# Patient Record
Sex: FEMALE | Race: WHITE | Employment: FULL TIME | URBAN - METROPOLITAN AREA
[De-identification: names, ages, dates, MRNs, and addresses within clinical notes are randomized per-mention and may not be internally consistent; named-entity substitution may affect disease eponyms.]

---

## 2017-04-26 ENCOUNTER — HOSPITAL ENCOUNTER (EMERGENCY)
Age: 52
Discharge: HOME | End: 2017-04-26
Attending: EMERGENCY MEDICINE
Payer: COMMERCIAL

## 2017-04-26 ENCOUNTER — APPOINTMENT (OUTPATIENT)
Dept: CT IMAGING | Age: 52
End: 2017-04-26
Attending: PHYSICIAN ASSISTANT
Payer: COMMERCIAL

## 2017-04-26 VITALS
OXYGEN SATURATION: 100 % | TEMPERATURE: 98 F | RESPIRATION RATE: 16 BRPM | DIASTOLIC BLOOD PRESSURE: 69 MMHG | HEART RATE: 94 BPM | SYSTOLIC BLOOD PRESSURE: 99 MMHG | BODY MASS INDEX: 28.25 KG/M2 | HEIGHT: 67 IN | WEIGHT: 180 LBS

## 2017-04-26 DIAGNOSIS — K57.92 ACUTE DIVERTICULITIS: Primary | ICD-10-CM

## 2017-04-26 PROCEDURE — 87086 URINE CULTURE/COLONY COUNT: CPT

## 2017-04-26 PROCEDURE — 96361 HYDRATE IV INFUSION ADD-ON: CPT

## 2017-04-26 PROCEDURE — 96366 THER/PROPH/DIAG IV INF ADDON: CPT

## 2017-04-26 PROCEDURE — 85025 COMPLETE CBC W/AUTO DIFF WBC: CPT | Performed by: PHYSICIAN ASSISTANT

## 2017-04-26 PROCEDURE — 83690 ASSAY OF LIPASE: CPT | Performed by: PHYSICIAN ASSISTANT

## 2017-04-26 PROCEDURE — 96365 THER/PROPH/DIAG IV INF INIT: CPT

## 2017-04-26 PROCEDURE — 80053 COMPREHEN METABOLIC PANEL: CPT | Performed by: PHYSICIAN ASSISTANT

## 2017-04-26 PROCEDURE — 74177 CT ABD & PELVIS W/CONTRAST: CPT

## 2017-04-26 PROCEDURE — 81001 URINALYSIS AUTO W/SCOPE: CPT

## 2017-04-26 PROCEDURE — 99284 EMERGENCY DEPT VISIT MOD MDM: CPT

## 2017-04-26 PROCEDURE — 81025 URINE PREGNANCY TEST: CPT

## 2017-04-26 PROCEDURE — 87147 CULTURE TYPE IMMUNOLOGIC: CPT | Performed by: EMERGENCY MEDICINE

## 2017-04-26 PROCEDURE — 96367 TX/PROPH/DG ADDL SEQ IV INF: CPT

## 2017-04-26 PROCEDURE — 96375 TX/PRO/DX INJ NEW DRUG ADDON: CPT

## 2017-04-26 PROCEDURE — 99285 EMERGENCY DEPT VISIT HI MDM: CPT

## 2017-04-26 RX ORDER — ACETAMINOPHEN 500 MG
1000 TABLET ORAL ONCE
Status: COMPLETED | OUTPATIENT
Start: 2017-04-26 | End: 2017-04-26

## 2017-04-26 RX ORDER — DOCUSATE SODIUM 100 MG/1
100 CAPSULE, LIQUID FILLED ORAL 2 TIMES DAILY
Qty: 60 CAPSULE | Refills: 0 | Status: SHIPPED | OUTPATIENT
Start: 2017-04-26 | End: 2017-05-26

## 2017-04-26 RX ORDER — KETOROLAC TROMETHAMINE 30 MG/ML
30 INJECTION, SOLUTION INTRAMUSCULAR; INTRAVENOUS ONCE
Status: DISCONTINUED | OUTPATIENT
Start: 2017-04-26 | End: 2017-04-26

## 2017-04-26 RX ORDER — METRONIDAZOLE 500 MG/100ML
500 INJECTION, SOLUTION INTRAVENOUS ONCE
Status: COMPLETED | OUTPATIENT
Start: 2017-04-26 | End: 2017-04-26

## 2017-04-26 RX ORDER — HYDROCODONE BITARTRATE AND ACETAMINOPHEN 5; 325 MG/1; MG/1
1 TABLET ORAL ONCE
Status: COMPLETED | OUTPATIENT
Start: 2017-04-26 | End: 2017-04-26

## 2017-04-26 RX ORDER — METRONIDAZOLE 500 MG/1
500 TABLET ORAL 3 TIMES DAILY
Qty: 30 TABLET | Refills: 0 | Status: SHIPPED | OUTPATIENT
Start: 2017-04-26 | End: 2017-05-06

## 2017-04-26 RX ORDER — ONDANSETRON 4 MG/1
4 TABLET, ORALLY DISINTEGRATING ORAL EVERY 6 HOURS PRN
Qty: 20 TABLET | Refills: 0 | Status: SHIPPED | OUTPATIENT
Start: 2017-04-26 | End: 2017-05-03

## 2017-04-26 RX ORDER — LEVOFLOXACIN 5 MG/ML
750 INJECTION, SOLUTION INTRAVENOUS ONCE
Status: COMPLETED | OUTPATIENT
Start: 2017-04-26 | End: 2017-04-26

## 2017-04-26 RX ORDER — LEVOFLOXACIN 750 MG/1
750 TABLET ORAL DAILY
Qty: 10 TABLET | Refills: 0 | Status: SHIPPED | OUTPATIENT
Start: 2017-04-26 | End: 2017-05-06

## 2017-04-26 RX ORDER — HYDROCODONE BITARTRATE AND ACETAMINOPHEN 5; 325 MG/1; MG/1
1-2 TABLET ORAL EVERY 6 HOURS PRN
Qty: 20 TABLET | Refills: 0 | Status: SHIPPED | OUTPATIENT
Start: 2017-04-26 | End: 2017-05-03

## 2017-04-26 RX ORDER — HYDROMORPHONE HYDROCHLORIDE 1 MG/ML
1 INJECTION, SOLUTION INTRAMUSCULAR; INTRAVENOUS; SUBCUTANEOUS ONCE
Status: COMPLETED | OUTPATIENT
Start: 2017-04-26 | End: 2017-04-26

## 2017-04-26 RX ORDER — ONDANSETRON 2 MG/ML
4 INJECTION INTRAMUSCULAR; INTRAVENOUS ONCE
Status: COMPLETED | OUTPATIENT
Start: 2017-04-26 | End: 2017-04-26

## 2017-04-26 NOTE — ED PROVIDER NOTES
Patient Seen in: THE [facility] Emergency Department In [place]    History   Patient presents with:  Abdomen/Flank Pain (GI/)    Stated Complaint: abdominal pain    HPI        Past Medical History   Diagnosis Date   • MITRAL VALVE PROLAPSE    • Menopause ag the following:     Ketones Urine 15  (*)     Blood Urine Trace-lysed (*)     Leukocyte Esterase Urine Small (*)     All other components within normal limits   URINE MICROSCOPIC W REFLEX CULTURE - Abnormal; Notable for the following:     WBC Urine 5-10 (*)

## 2017-04-26 NOTE — ED PROVIDER NOTES
Patient Seen in: THE [facility] Emergency Department In [place]    History   Patient presents with:  Abdomen/Flank Pain (GI/)    Stated Complaint: abdominal pain    HPI    53-year-old female presents to the emergency department for evaluation of abdominal p instructions. Ergocalciferol (VITAMIN D OR),  Take by mouth.        Family History   Problem Relation Age of Onset   • Cancer Father      esophegeal   • Cancer Mother      breast   • Breast Cancer Mother      dx age 54   • Cancer Sister      breast, diagn is alert. Skin: Skin is warm and dry. Psychiatric: She has a normal mood and affect.  Her behavior is normal. Thought content normal.             ED Course     Labs Reviewed   URINALYSIS WITH CULTURE REFLEX - Abnormal; Notable for the following:     Ket 15:05       Approved by:  Filippo Looney MD               Narrative:     PROCEDURE:  CT ABDOMEN+PELVIS(CONTRAST ONLY)(CPT=74177)     COMPARISON:  None.     INDICATIONS:  abdominal pain     TECHNIQUE:  CT scanning was performed from the dome of the diaphragm to    BONES:  Normal.  No bony lesion or fracture.    LUNG BASES:  Normal.  No visible pulmonary or pleural disease.              MDM   Patient has CT evidence of acute diverticulitis without abscess or perforation.   Given IV Levaquin 750 mg and Flagyl 500mg

## 2017-04-28 ENCOUNTER — APPOINTMENT (OUTPATIENT)
Dept: CT IMAGING | Facility: HOSPITAL | Age: 52
DRG: 392 | End: 2017-04-28
Attending: EMERGENCY MEDICINE
Payer: COMMERCIAL

## 2017-04-28 ENCOUNTER — HOSPITAL ENCOUNTER (INPATIENT)
Facility: HOSPITAL | Age: 52
LOS: 2 days | Discharge: HOME | DRG: 392 | End: 2017-04-30
Attending: EMERGENCY MEDICINE | Admitting: INTERNAL MEDICINE
Payer: COMMERCIAL

## 2017-04-28 DIAGNOSIS — K57.92 ACUTE DIVERTICULITIS: Primary | ICD-10-CM

## 2017-04-28 PROCEDURE — 99223 1ST HOSP IP/OBS HIGH 75: CPT | Performed by: INTERNAL MEDICINE

## 2017-04-28 PROCEDURE — 74177 CT ABD & PELVIS W/CONTRAST: CPT

## 2017-04-28 RX ORDER — DIPHENHYDRAMINE HYDROCHLORIDE 50 MG/ML
50 INJECTION INTRAMUSCULAR; INTRAVENOUS ONCE
Status: COMPLETED | OUTPATIENT
Start: 2017-04-28 | End: 2017-04-28

## 2017-04-28 RX ORDER — DIPHENHYDRAMINE HYDROCHLORIDE 50 MG/ML
INJECTION INTRAMUSCULAR; INTRAVENOUS
Status: DISPENSED
Start: 2017-04-28 | End: 2017-04-29

## 2017-04-28 RX ORDER — METRONIDAZOLE 500 MG/100ML
500 INJECTION, SOLUTION INTRAVENOUS EVERY 8 HOURS
Status: DISCONTINUED | OUTPATIENT
Start: 2017-04-28 | End: 2017-04-30

## 2017-04-28 RX ORDER — HYDROMORPHONE HYDROCHLORIDE 1 MG/ML
0.5 INJECTION, SOLUTION INTRAMUSCULAR; INTRAVENOUS; SUBCUTANEOUS EVERY 30 MIN PRN
Status: ACTIVE | OUTPATIENT
Start: 2017-04-28 | End: 2017-04-28

## 2017-04-28 RX ORDER — ACETAMINOPHEN 325 MG/1
650 TABLET ORAL EVERY 6 HOURS PRN
Status: DISCONTINUED | OUTPATIENT
Start: 2017-04-28 | End: 2017-04-30

## 2017-04-28 RX ORDER — ONDANSETRON 2 MG/ML
4 INJECTION INTRAMUSCULAR; INTRAVENOUS EVERY 6 HOURS PRN
Status: DISCONTINUED | OUTPATIENT
Start: 2017-04-28 | End: 2017-04-30

## 2017-04-28 RX ORDER — SODIUM CHLORIDE 9 MG/ML
INJECTION, SOLUTION INTRAVENOUS CONTINUOUS
Status: DISCONTINUED | OUTPATIENT
Start: 2017-04-28 | End: 2017-04-30

## 2017-04-28 RX ORDER — HYDROMORPHONE HYDROCHLORIDE 1 MG/ML
0.5 INJECTION, SOLUTION INTRAMUSCULAR; INTRAVENOUS; SUBCUTANEOUS EVERY 30 MIN PRN
Status: COMPLETED | OUTPATIENT
Start: 2017-04-28 | End: 2017-04-28

## 2017-04-28 RX ORDER — SODIUM CHLORIDE 9 MG/ML
INJECTION, SOLUTION INTRAVENOUS ONCE
Status: COMPLETED | OUTPATIENT
Start: 2017-04-28 | End: 2017-04-28

## 2017-04-28 RX ORDER — LEVOFLOXACIN 5 MG/ML
500 INJECTION, SOLUTION INTRAVENOUS ONCE
Status: DISCONTINUED | OUTPATIENT
Start: 2017-04-28 | End: 2017-04-28

## 2017-04-28 RX ORDER — LEVOFLOXACIN 5 MG/ML
750 INJECTION, SOLUTION INTRAVENOUS EVERY 24 HOURS
Status: DISCONTINUED | OUTPATIENT
Start: 2017-04-28 | End: 2017-04-28

## 2017-04-28 RX ORDER — METRONIDAZOLE 500 MG/100ML
500 INJECTION, SOLUTION INTRAVENOUS EVERY 8 HOURS
Status: DISCONTINUED | OUTPATIENT
Start: 2017-04-28 | End: 2017-04-28

## 2017-04-28 RX ORDER — KETOROLAC TROMETHAMINE 15 MG/ML
15 INJECTION, SOLUTION INTRAMUSCULAR; INTRAVENOUS EVERY 6 HOURS PRN
Status: DISCONTINUED | OUTPATIENT
Start: 2017-04-28 | End: 2017-04-30

## 2017-04-28 RX ORDER — ENOXAPARIN SODIUM 100 MG/ML
40 INJECTION SUBCUTANEOUS DAILY
Status: DISCONTINUED | OUTPATIENT
Start: 2017-04-28 | End: 2017-04-30

## 2017-04-28 RX ORDER — ONDANSETRON 2 MG/ML
4 INJECTION INTRAMUSCULAR; INTRAVENOUS EVERY 4 HOURS PRN
Status: DISCONTINUED | OUTPATIENT
Start: 2017-04-28 | End: 2017-04-30

## 2017-04-28 RX ORDER — ONDANSETRON 2 MG/ML
4 INJECTION INTRAMUSCULAR; INTRAVENOUS
Status: DISCONTINUED | OUTPATIENT
Start: 2017-04-28 | End: 2017-04-30

## 2017-04-28 RX ORDER — METRONIDAZOLE 500 MG/100ML
500 INJECTION, SOLUTION INTRAVENOUS ONCE
Status: DISCONTINUED | OUTPATIENT
Start: 2017-04-28 | End: 2017-04-28

## 2017-04-28 NOTE — CONSULTS
[facility]  Report of Consultation    Chandler Birdie Pedrito Patient Status:  Emergency    3/27/1965 MRN [id number]   Location [address] [facility] Attending lFores Gary MD   Hosp Day # 0 PCP Kimberley Llamas MD     Reason for [facility] Diverticulitis          Past Surgical History    CHOLECYSTECTOMY      EAR AND THROAT EXAMINATION  inner ear surgery    OTHER SURGICAL HISTORY      Comment hernia repair     Family History   Problem Relation Age of Onset   • Cancer Father      esophegeal for cough, dyspnea and wheezing      Physical Exam:  Blood pressure 135/88, pulse 74, temperature 98.3 °F (36.8 °C), temperature source Temporal, resp. rate 16, height 67\", weight 180 lb, SpO2 98 %, not currently breastfeeding.     General: Alert, orientat management for acute diverticulitis. Discussed with patient that if she fails to improve with IV antibiotics, she may require an urgent resection of the colon and possible colostomy.  Our plan will be to get her through this acute attack and follow up outpa

## 2017-04-28 NOTE — ED PROVIDER NOTES
Patient Seen in: [facility] Emergency Department    History   Patient presents with:  Abdomen/Flank Pain (GI/)    Stated Complaint: Abd pain    HPI    Patient presents to the emergency department with abdominal pain.   She was at the [facility] PEG 3350-KCl-NaBcb-NaCl-NaSulf (PEG 3350/ELECTROLYTES) 240 g Oral Recon Soln,  Use as directed per physician's instructions.        Family History   Problem Relation Age of Onset   • Cancer Father      esophegeal   • Cancer Mother      breast   • Breast guarding  Extremities: Unremarkable. Calves nonswollen, symmetric, nontender. No pedal edema. Skin: Unremarkable. No skin change or skin rash in the area patient's discomfort. Neurologic:  Mental status as above.   Patient moves all extremities with neg    CT abdomen: There is interval worsening of inflammatory changes associated with the area of acute diverticulitis along the splenic flexure and proximal to mid descending colon.  There is a developing focus of extraluminal gas and air along the anter

## 2017-04-28 NOTE — ED NOTES
Pt had allergic reaction to zosyn. The infusion was almost complete and pt broke out with generalized hives. No airway difficulties. md notified. Benadryl given.  I called and updated Vanesa Gonzalez the RN assuming care on the floor

## 2017-04-28 NOTE — H&P
[facility]  History and Physical     Cole Major Patient Status:  Emergency    3/27/1965 MRN [id number]   Location [address] [facility] Attending Willem Malik MD   Hosp Day # 0 PCP Joleen Neff MD     Chief Complaint: Rfl: 0   HYDROcodone-acetaminophen 5-325 MG Oral Tab Take 1-2 tablets by mouth every 6 (six) hours as needed for Pain.  Disp: 20 tablet Rfl: 0   ondansetron 4 MG Oral Tablet Dispersible Take 1 tablet (4 mg total) by mouth every 6 (six) hours as needed for N ALKPHO  76  72   AST  14*   --    ALT  25  22   BILT  0.9  0.6   TP  7.5  7.8       Estimated Creatinine Clearance: 123.5 mL/min (based on Cr of 0.53). No results for input(s): PTP, INR in the last 72 hours.     No results for input(s): TROP, CK in the

## 2017-04-28 NOTE — PLAN OF CARE
NURSING ADMISSION NOTE      Patient admitted via cart from ER. No rash noted from the iv zosyn, after the benadryl given in ER. Oriented to room. Safety precautions initiated. Bed in low position. Call light in reach.   Updated history and pta meds

## 2017-04-28 NOTE — ED INITIAL ASSESSMENT (HPI)
Pt returns to the ED because she continues to have abd pain & nausea. Now has a headache. Pt had a low grade temp lastnight. Pt dx weds w/ divertic - currently taking antibx, norco zofran. Pt concerned because she really isn't feeling better yet.

## 2017-04-29 PROCEDURE — 99232 SBSQ HOSP IP/OBS MODERATE 35: CPT | Performed by: INTERNAL MEDICINE

## 2017-04-29 NOTE — PLAN OF CARE
Maintains adequate nutritional intake (undernourished) Not Progressing      Minimal or absence of nausea and vomiting Progressing      Maintains or returns to baseline bowel function Progressing      Verbalizes/displays adequate comfort level or patient's

## 2017-04-29 NOTE — PROGRESS NOTES
Per lab glucose 69. Pt   asymptomic . Glucose liquid given orally. Pct informed to recheck glucose. Endorsed to on coming jazmine davis.  Pt received clear liquid tray encouraged to eat breakfast

## 2017-04-29 NOTE — PROGRESS NOTES
[facility]  Progress Note     Debi Bui Patient Status:  Inpatient    3/27/1965 MRN [id number]   [facility] 3NW-A Attending Ant Larson MD   Hosp Day # 1 PCP Sher Flower MD     Chief Complaint: abd cramps    S: Patient TROP, CK in the last 72 hours. Imaging: Imaging data reviewed in Epic. Medications:   • enoxaparin  40 mg Subcutaneous Daily   • metRONIDAZOLE  500 mg Intravenous Q8H   • cefepime  1 g Intravenous Q8H       ASSESSMENT / PLAN:     1.  Acute divert

## 2017-04-29 NOTE — PROGRESS NOTES
[facility]  Progress Note    Chandler Major Patient Status:  Inpatient    3/27/1965 MRN [id number]   [facility] 3NW-A Attending Kelly Fox MD   Hosp Day # 1 PCP Kimberley Llamas MD     Subjective:  Feels good. No pain.   No nausea time.  6. D/c when medically cleared. 7. All questions answered.       Steve Glover MD  4/29/2017  7:01 AM

## 2017-04-29 NOTE — PAYOR COMM NOTE
Attending Physician: Tyree Kilgore MD  4/28  ED       Abdomen/Flank Pain     Stated Complaint: Abd pain    POOR PO  PERSISTENT NAUSEA  Patient presents to the emergency department with abdominal pain.  She was at the [place] emergency department 2 days RESULTS LAST 24HRS:  Labs Reviewed   COMP METABOLIC PANEL (14) - Abnormal; Notable for the following:     Creatinine 0.53 (*)     Albumin 3.3 (*)     Sodium 135 (*)     All other components within normal limits   LIPASE - Abnormal; Notable for

## 2017-04-29 NOTE — DIETARY NOTE
Nutrition Short Note    Dietitian consult received. Low fiber diet education completed. Will continue to monitor and follow pt nutritional status. RD available PRN.      Yessi Villeda MS, RDN, LDN  Pager [phone]

## 2017-04-30 VITALS
HEART RATE: 76 BPM | WEIGHT: 180 LBS | HEIGHT: 67 IN | TEMPERATURE: 98 F | DIASTOLIC BLOOD PRESSURE: 83 MMHG | SYSTOLIC BLOOD PRESSURE: 119 MMHG | RESPIRATION RATE: 18 BRPM | BODY MASS INDEX: 28.25 KG/M2 | OXYGEN SATURATION: 97 %

## 2017-04-30 PROCEDURE — 99239 HOSP IP/OBS DSCHRG MGMT >30: CPT | Performed by: INTERNAL MEDICINE

## 2017-04-30 NOTE — PROGRESS NOTES
[facility]  Progress Note    Carmine Heading Pedrito Patient Status:  Inpatient    3/27/1965 MRN [id number]   [facility] [place] 3NW-A Attending Kristian Anne MD   Hosp Day # 2 PCP Jung Mckee MD     Subjective:  Feels good. No pain.   Eating ok

## 2017-04-30 NOTE — PLAN OF CARE
GASTROINTESTINAL - ADULT    • Minimal or absence of nausea and vomiting Completed    • Maintains or returns to baseline bowel function Completed    • Maintains adequate nutritional intake (undernourished) Completed        PAIN - ADULT    • Verbalizes/displ

## 2017-04-30 NOTE — DISCHARGE SUMMARY
[facility]  DISCHARGE SUMMARY     Jair Major Patient Status:  Inpatient    3/27/1965 MRN [id number]   [facility] 3NW-A Attending Mira Koenig MD   Hosp Day # 2 PCP Brianna Prescott MD     Date of Admission: 2017  Date of or significant findings and recommendations (brief descriptions):  • none    Lab/Test results pending at Discharge:   · none    Consultants:  • surgery    Discharge Medication List:     Discharge Medications      CONTINUE taking these medications       Ins distress. Respiratory: Clear to auscultation bilaterally. No wheezes. No rhonchi. Cardiovascular: S1, S2. Regular rate and rhythm. No murmurs, rubs or gallops. Abdomen: Soft, nontender, nondistended. Positive bowel sounds. No rebound or guarding.   Ne

## 2017-04-30 NOTE — PROGRESS NOTES
NURSING DISCHARGE NOTE    Discharged pt via wheelchair to [employer]. Accompanied by hospital staff memeber. Belongings at hand. IV catheter d/c'd; catheter intact. Discharge instruction and education given to pt and verbalizes understanding.  All ques

## 2017-05-03 ENCOUNTER — TELEPHONE (OUTPATIENT)
Dept: SURGERY | Facility: CLINIC | Age: 52
End: 2017-05-03

## 2017-05-03 NOTE — TELEPHONE ENCOUNTER
Patient call with condition update. She was seen at THE [facility] for diverticulitis--taking antibiotics. C/o fever of 102, left sided abdominal pain 1/10 and nausea. No vomiting and having small bowel movements. Notified Otto Carlos Manuel CUBA.  Advised patient to t

## 2017-05-15 ENCOUNTER — OFFICE VISIT (OUTPATIENT)
Dept: SURGERY | Facility: CLINIC | Age: 52
End: 2017-05-15

## 2017-05-15 VITALS
HEART RATE: 94 BPM | WEIGHT: 190 LBS | BODY MASS INDEX: 29.82 KG/M2 | DIASTOLIC BLOOD PRESSURE: 69 MMHG | HEIGHT: 67 IN | SYSTOLIC BLOOD PRESSURE: 99 MMHG

## 2017-05-15 DIAGNOSIS — K57.92 ACUTE DIVERTICULITIS: Primary | ICD-10-CM

## 2017-05-15 PROCEDURE — 99243 OFF/OP CNSLTJ NEW/EST LOW 30: CPT | Performed by: SURGERY

## 2017-05-15 RX ORDER — POLYETHYLENE GLYCOL 3350, SODIUM CHLORIDE, SODIUM BICARBONATE, POTASSIUM CHLORIDE 420; 11.2; 5.72; 1.48 G/4L; G/4L; G/4L; G/4L
POWDER, FOR SOLUTION ORAL
Qty: 1 BOTTLE | Refills: 0 | Status: SHIPPED | OUTPATIENT
Start: 2017-05-15 | End: 2017-08-01 | Stop reason: ALTCHOICE

## 2017-05-15 RX ORDER — METOPROLOL SUCCINATE 25 MG/1
25 TABLET, EXTENDED RELEASE ORAL
COMMUNITY
End: 2018-01-19 | Stop reason: ALTCHOICE

## 2017-05-15 NOTE — H&P
New Patient Visit Note       Active Problems      1. Acute diverticulitis        Chief Complaint   Patient presents with:  Diverticulitis: New pt saw in hospital for diverticulitis on 4/26. Pt denies any ABD pain, changed diet to help symptoms.        Histo with: Male       Birth Control/Protection: Vasectomy         Current Outpatient Prescriptions:  PEG 3350-KCl-Na Bicarb-NaCl (TRILYTE) 420 g Oral Recon Soln Starting at 4:00 pm the night before procedure, drink 8 ounces of the prep every 15-20 minutes until deviation present. Cardiovascular: Normal rate, regular rhythm and normal heart sounds. Pulmonary/Chest: Effort normal and breath sounds normal. No respiratory distress. She has no wheezes. She has no rales. Abdominal: Soft.  Bowel sounds are normal.

## 2017-08-01 ENCOUNTER — OFFICE VISIT (OUTPATIENT)
Dept: SURGERY | Facility: CLINIC | Age: 52
End: 2017-08-01

## 2017-08-01 VITALS
HEART RATE: 71 BPM | WEIGHT: 180 LBS | DIASTOLIC BLOOD PRESSURE: 81 MMHG | BODY MASS INDEX: 28.25 KG/M2 | SYSTOLIC BLOOD PRESSURE: 114 MMHG | RESPIRATION RATE: 16 BRPM | HEIGHT: 67 IN

## 2017-08-01 DIAGNOSIS — K57.90 DIVERTICULOSIS OF INTESTINE WITHOUT PERFORATION OR ABSCESS WITHOUT BLEEDING: Primary | ICD-10-CM

## 2017-08-01 PROCEDURE — 99212 OFFICE O/P EST SF 10 MIN: CPT | Performed by: SURGERY

## 2017-08-01 NOTE — PROGRESS NOTES
Follow Up Visit Note       Active Problems      1. Diverticulosis of intestine without perforation or abscess without bleeding          Chief Complaint   Patient presents with:  Colonoscopy: EST PT f/u c-scope on 7/7. PT denies any new issues or concerns. Sexual activity: Yes               Partners with: Male       Birth control/protection: Vasectomy         Current Outpatient Prescriptions:  Metoprolol Succinate ER 25 MG Oral Tablet 24 Hr Take 25 mg by mouth.  Disp:  Rfl:    Ergocalciferol (VITAMIN provided. · Repeat colonoscopy in 10 years or sooner as needed. · All questions answered. No orders of the defined types were placed in this encounter. Imaging & Referrals   None    Follow Up  No Follow-up on file.     Marija Adams MD

## 2017-08-02 NOTE — PATIENT INSTRUCTIONS
HIGH FIBER DIET    This high fiber diet includes two major components. One is a natural high fiber that is dietary high. The other is a fiber supplement.     Natural Dietary Fiber:    These are very few products on the market that have high enough fiber c are simply an equivalent to a ground up apple peel. That is they just provide extra natural fiber. They will enter your colon in a non-digestable form providing bulk to your stool.   Therefore, they are safe to take on a daily basis for the rest of you li problems:    1. Most people describe a gassy or bloated feeling for the first ten days to two weeks. This will pass with time. I recommend that once you get on the diet that you stay on it and complete it as prescribed.   Again, you will enjoy normal reinaldo

## 2018-05-15 ENCOUNTER — HOSPITAL ENCOUNTER (OUTPATIENT)
Dept: CT IMAGING | Age: 53
Discharge: HOME | End: 2018-05-15
Attending: FAMILY MEDICINE

## 2018-05-15 DIAGNOSIS — Z13.9 VISIT FOR SCREENING: ICD-10-CM

## 2018-09-10 ENCOUNTER — TELEPHONE (OUTPATIENT)
Dept: SURGERY | Facility: CLINIC | Age: 53
End: 2018-09-10

## 2018-09-10 NOTE — TELEPHONE ENCOUNTER
Pt has hx of diverticulitis with hospitalization and started to have the same symptoms on Sat with temp of 100. Took left over antibiotic from Mother and feels much better, but still having slight pain. Appt made for tomorrow for evaluation.

## 2018-09-11 ENCOUNTER — OFFICE VISIT (OUTPATIENT)
Dept: SURGERY | Facility: CLINIC | Age: 53
End: 2018-09-11
Payer: COMMERCIAL

## 2018-09-11 VITALS
HEIGHT: 67 IN | DIASTOLIC BLOOD PRESSURE: 82 MMHG | WEIGHT: 203 LBS | BODY MASS INDEX: 31.86 KG/M2 | SYSTOLIC BLOOD PRESSURE: 130 MMHG | TEMPERATURE: 99 F | HEART RATE: 101 BPM

## 2018-09-11 DIAGNOSIS — K57.32 DIVERTICULITIS OF LARGE INTESTINE WITHOUT PERFORATION OR ABSCESS WITHOUT BLEEDING: Primary | ICD-10-CM

## 2018-09-11 PROCEDURE — 99213 OFFICE O/P EST LOW 20 MIN: CPT | Performed by: SURGERY

## 2018-09-11 RX ORDER — CIPROFLOXACIN 500 MG/1
500 TABLET, FILM COATED ORAL 2 TIMES DAILY
Qty: 20 TABLET | Refills: 0 | Status: SHIPPED | OUTPATIENT
Start: 2018-09-11 | End: 2018-09-21

## 2018-09-11 RX ORDER — METRONIDAZOLE 500 MG/1
500 TABLET ORAL 3 TIMES DAILY
Qty: 30 TABLET | Refills: 0 | Status: SHIPPED | OUTPATIENT
Start: 2018-09-11 | End: 2018-09-21

## 2018-09-11 NOTE — PROGRESS NOTES
Follow Up Visit Note       Active Problems      1. Diverticulitis of large intestine without perforation or abscess without bleeding          Chief Complaint   Patient presents with:  Diverticulitis: Patient has hx of diverticulitis.  Started to have the [facility] Spouse name: Not on file      Number of children: Not on file      Years of education: Not on file      Highest education level: Not on file    Social Needs      Financial resource strain: Not on file      Food insecurity - worry: Not on file      Food ins stool, constipation, diarrhea, nausea and vomiting. Genitourinary: Negative for difficulty urinating, dysuria, frequency and urgency. Musculoskeletal: Negative for arthralgias and myalgias. Skin: Negative for color change and rash.    Neurological: Ne

## 2018-10-02 ENCOUNTER — OFFICE VISIT (OUTPATIENT)
Dept: SURGERY | Facility: CLINIC | Age: 53
End: 2018-10-02
Payer: COMMERCIAL

## 2018-10-02 VITALS
BODY MASS INDEX: 28.25 KG/M2 | HEART RATE: 71 BPM | WEIGHT: 180 LBS | TEMPERATURE: 98 F | SYSTOLIC BLOOD PRESSURE: 119 MMHG | HEIGHT: 67 IN | DIASTOLIC BLOOD PRESSURE: 87 MMHG

## 2018-10-02 DIAGNOSIS — K57.32 DIVERTICULITIS OF LARGE INTESTINE WITHOUT PERFORATION OR ABSCESS WITHOUT BLEEDING: Primary | ICD-10-CM

## 2018-10-02 PROCEDURE — 99212 OFFICE O/P EST SF 10 MIN: CPT | Performed by: SURGERY

## 2018-10-02 NOTE — PROGRESS NOTES
Follow Up Visit Note       Active Problems      1. Diverticulitis of large intestine without perforation or abscess without bleeding          Chief Complaint   Patient presents with:   Follow - Up: EST PT DIVERTICULITIS,SEEN 2-3WKS AGO FOR FLARE UP, [place]ia [place] - inability: Not on file      Transportation needs - medical: Not on file      Transportation needs - non-medical: Not on file    Occupational History      Not on file    Tobacco Use      Smoking status: Never Smoker      Smokeless tobacco: Never Used    S questions answered. No orders of the defined types were placed in this encounter. Imaging & Referrals   None    Follow Up  No Follow-up on file.     Darrion Argueta MD

## 2019-01-22 PROCEDURE — 88175 CYTOPATH C/V AUTO FLUID REDO: CPT | Performed by: OBSTETRICS & GYNECOLOGY

## 2019-01-22 PROCEDURE — 87624 HPV HI-RISK TYP POOLED RSLT: CPT | Performed by: OBSTETRICS & GYNECOLOGY

## 2019-08-28 ENCOUNTER — TELEPHONE (OUTPATIENT)
Dept: SURGERY | Facility: CLINIC | Age: 54
End: 2019-08-28

## 2019-08-28 RX ORDER — METRONIDAZOLE 250 MG/1
250 TABLET ORAL 3 TIMES DAILY
Qty: 21 TABLET | Refills: 0 | Status: SHIPPED | OUTPATIENT
Start: 2019-08-28 | End: 2019-10-11

## 2019-08-29 NOTE — TELEPHONE ENCOUNTER
The patient with similar symptoms to prior diverticulitis episodes. She states that she has Cipro at home left over enough for a week. She was instructed to take her Cipro, and will be prescribed flagyl as well to take three times a day.  She was instructed

## 2019-10-11 ENCOUNTER — APPOINTMENT (OUTPATIENT)
Dept: GENERAL RADIOLOGY | Age: 54
End: 2019-10-11
Attending: EMERGENCY MEDICINE
Payer: COMMERCIAL

## 2019-10-11 ENCOUNTER — HOSPITAL ENCOUNTER (EMERGENCY)
Age: 54
Discharge: HOME | End: 2019-10-11
Attending: EMERGENCY MEDICINE
Payer: COMMERCIAL

## 2019-10-11 ENCOUNTER — APPOINTMENT (OUTPATIENT)
Dept: CT IMAGING | Age: 54
End: 2019-10-11
Attending: EMERGENCY MEDICINE
Payer: COMMERCIAL

## 2019-10-11 VITALS
WEIGHT: 180 LBS | SYSTOLIC BLOOD PRESSURE: 140 MMHG | HEIGHT: 67 IN | TEMPERATURE: 98 F | RESPIRATION RATE: 16 BRPM | OXYGEN SATURATION: 97 % | DIASTOLIC BLOOD PRESSURE: 81 MMHG | HEART RATE: 64 BPM | BODY MASS INDEX: 28.25 KG/M2

## 2019-10-11 DIAGNOSIS — M54.9 UPPER BACK PAIN ON RIGHT SIDE: Primary | ICD-10-CM

## 2019-10-11 PROCEDURE — 96374 THER/PROPH/DIAG INJ IV PUSH: CPT | Performed by: EMERGENCY MEDICINE

## 2019-10-11 PROCEDURE — 71275 CT ANGIOGRAPHY CHEST: CPT | Performed by: EMERGENCY MEDICINE

## 2019-10-11 PROCEDURE — 80053 COMPREHEN METABOLIC PANEL: CPT | Performed by: EMERGENCY MEDICINE

## 2019-10-11 PROCEDURE — 81003 URINALYSIS AUTO W/O SCOPE: CPT | Performed by: EMERGENCY MEDICINE

## 2019-10-11 PROCEDURE — 93010 ELECTROCARDIOGRAM REPORT: CPT | Performed by: EMERGENCY MEDICINE

## 2019-10-11 PROCEDURE — 99285 EMERGENCY DEPT VISIT HI MDM: CPT | Performed by: EMERGENCY MEDICINE

## 2019-10-11 PROCEDURE — 85379 FIBRIN DEGRADATION QUANT: CPT | Performed by: EMERGENCY MEDICINE

## 2019-10-11 PROCEDURE — 71045 X-RAY EXAM CHEST 1 VIEW: CPT | Performed by: EMERGENCY MEDICINE

## 2019-10-11 PROCEDURE — 85025 COMPLETE CBC W/AUTO DIFF WBC: CPT | Performed by: EMERGENCY MEDICINE

## 2019-10-11 PROCEDURE — 96376 TX/PRO/DX INJ SAME DRUG ADON: CPT | Performed by: EMERGENCY MEDICINE

## 2019-10-11 PROCEDURE — 74175 CTA ABDOMEN W/CONTRAST: CPT | Performed by: EMERGENCY MEDICINE

## 2019-10-11 PROCEDURE — 93005 ELECTROCARDIOGRAM TRACING: CPT

## 2019-10-11 PROCEDURE — 96375 TX/PRO/DX INJ NEW DRUG ADDON: CPT | Performed by: EMERGENCY MEDICINE

## 2019-10-11 PROCEDURE — 84484 ASSAY OF TROPONIN QUANT: CPT | Performed by: EMERGENCY MEDICINE

## 2019-10-11 PROCEDURE — 83690 ASSAY OF LIPASE: CPT | Performed by: EMERGENCY MEDICINE

## 2019-10-11 RX ORDER — KETOROLAC TROMETHAMINE 30 MG/ML
15 INJECTION, SOLUTION INTRAMUSCULAR; INTRAVENOUS ONCE
Status: COMPLETED | OUTPATIENT
Start: 2019-10-11 | End: 2019-10-11

## 2019-10-11 RX ORDER — CYCLOBENZAPRINE HCL 10 MG
10 TABLET ORAL 3 TIMES DAILY PRN
Qty: 20 TABLET | Refills: 0 | Status: SHIPPED | OUTPATIENT
Start: 2019-10-11 | End: 2019-10-18

## 2019-10-11 RX ORDER — ONDANSETRON 2 MG/ML
4 INJECTION INTRAMUSCULAR; INTRAVENOUS ONCE
Status: COMPLETED | OUTPATIENT
Start: 2019-10-11 | End: 2019-10-11

## 2019-10-11 RX ORDER — HYDROMORPHONE HYDROCHLORIDE 1 MG/ML
0.5 INJECTION, SOLUTION INTRAMUSCULAR; INTRAVENOUS; SUBCUTANEOUS EVERY 30 MIN PRN
Status: DISCONTINUED | OUTPATIENT
Start: 2019-10-11 | End: 2019-10-11

## 2019-10-11 RX ORDER — HYDROMORPHONE HYDROCHLORIDE 1 MG/ML
1 INJECTION, SOLUTION INTRAMUSCULAR; INTRAVENOUS; SUBCUTANEOUS ONCE
Status: COMPLETED | OUTPATIENT
Start: 2019-10-11 | End: 2019-10-11

## 2019-10-11 NOTE — ED PROVIDER NOTES
Patient Seen in: THE [facility] Emergency Department In [place]      History   Patient presents with:  Back Pain (musculoskeletal)    Stated Complaint: back pain    HPI    This is a 21-year-old female complaining of right upper back pain patient states this s ED Triage Vitals [10/11/19 1123]   BP (!) 153/97   Pulse 73   Resp 18   Temp 97.8 °F (36.6 °C)   Temp src Temporal   SpO2 98 %   O2 Device None (Room air)       Current:BP (!) 152/97   Pulse 73   Temp 97.8 °F (36.6 °C) (Temporal)   Resp 18   Ht 170.2 c -----------         ------                     CBC W/ DIFFERENTIAL[[id number]]                              Final result                 Please view results for these tests on the individual orders.    URINALYSIS WITH CULTURE REFLEX   DANIEL ACUNA RAI medications    Cyclobenzaprine HCl 10 MG Oral Tab  Take 1 tablet (10 mg total) by mouth 3 (three) times daily as needed for Muscle spasms.   Qty: 20 tablet Refills: 0

## 2019-12-04 PROBLEM — M23.91 INTERNAL DERANGEMENT OF KNEE, RIGHT: Status: ACTIVE | Noted: 2019-12-04

## 2019-12-04 PROBLEM — M17.11 PRIMARY OSTEOARTHRITIS OF RIGHT KNEE: Status: ACTIVE | Noted: 2019-12-04

## 2020-12-31 ENCOUNTER — OFFICE VISIT (OUTPATIENT)
Dept: FAMILY MEDICINE CLINIC | Facility: CLINIC | Age: 55
End: 2020-12-31
Payer: COMMERCIAL

## 2020-12-31 VITALS
DIASTOLIC BLOOD PRESSURE: 78 MMHG | TEMPERATURE: 98 F | HEIGHT: 66.5 IN | WEIGHT: 212 LBS | RESPIRATION RATE: 16 BRPM | OXYGEN SATURATION: 98 % | BODY MASS INDEX: 33.67 KG/M2 | HEART RATE: 88 BPM | SYSTOLIC BLOOD PRESSURE: 118 MMHG

## 2020-12-31 DIAGNOSIS — Z00.00 LABORATORY EXAM ORDERED AS PART OF ROUTINE GENERAL MEDICAL EXAMINATION: ICD-10-CM

## 2020-12-31 DIAGNOSIS — Z23 NEED FOR VACCINATION: ICD-10-CM

## 2020-12-31 DIAGNOSIS — E55.9 VITAMIN D INSUFFICIENCY: ICD-10-CM

## 2020-12-31 DIAGNOSIS — Z12.83 SKIN CANCER SCREENING: ICD-10-CM

## 2020-12-31 DIAGNOSIS — Z78.0 ASYMPTOMATIC MENOPAUSAL STATE: ICD-10-CM

## 2020-12-31 DIAGNOSIS — Z12.31 ENCOUNTER FOR SCREENING MAMMOGRAM FOR MALIGNANT NEOPLASM OF BREAST: ICD-10-CM

## 2020-12-31 DIAGNOSIS — Z00.00 ROUTINE MEDICAL EXAM: Primary | ICD-10-CM

## 2020-12-31 PROBLEM — K57.32 DIVERTICULITIS LARGE INTESTINE: Status: RESOLVED | Noted: 2018-09-11 | Resolved: 2020-12-31

## 2020-12-31 PROBLEM — K57.90 DIVERTICULOSIS OF INTESTINE WITHOUT PERFORATION OR ABSCESS WITHOUT BLEEDING: Status: RESOLVED | Noted: 2017-04-28 | Resolved: 2020-12-31

## 2020-12-31 PROCEDURE — 90686 IIV4 VACC NO PRSV 0.5 ML IM: CPT | Performed by: FAMILY MEDICINE

## 2020-12-31 PROCEDURE — 3008F BODY MASS INDEX DOCD: CPT | Performed by: FAMILY MEDICINE

## 2020-12-31 PROCEDURE — 90715 TDAP VACCINE 7 YRS/> IM: CPT | Performed by: FAMILY MEDICINE

## 2020-12-31 PROCEDURE — G0438 PPPS, INITIAL VISIT: HCPCS | Performed by: FAMILY MEDICINE

## 2020-12-31 PROCEDURE — 3078F DIAST BP <80 MM HG: CPT | Performed by: FAMILY MEDICINE

## 2020-12-31 PROCEDURE — 90471 IMMUNIZATION ADMIN: CPT | Performed by: FAMILY MEDICINE

## 2020-12-31 PROCEDURE — 3074F SYST BP LT 130 MM HG: CPT | Performed by: FAMILY MEDICINE

## 2020-12-31 PROCEDURE — 99386 PREV VISIT NEW AGE 40-64: CPT | Performed by: FAMILY MEDICINE

## 2020-12-31 PROCEDURE — 90472 IMMUNIZATION ADMIN EACH ADD: CPT | Performed by: FAMILY MEDICINE

## 2020-12-31 NOTE — PROGRESS NOTES
Patient presents with:  New Patient: Maximiliano Villarreal henok  Physical  Immunization/Injection: discuss the Influenza vaccine, has never had. HPI:  Sophia Roe is a 50year old female here today for preventative visit.         Imms- up to date with PP facility-administered medications on file prior to visit.        Codeine                 NAUSEA AND VOMITING  Adhesive Tape           RASH    Comment:From electrode pads  Zosyn [Piperacillin*    HIVES, ITCHING  Social History    Socioeconomic History      M Belt: Not Asked        Special Diet: Not Asked        Stress Concern: Not Asked        Weight Concern: Not Asked    Social History Narrative      Not on file    Family History   Problem Relation Age of Onset   • Cancer Father         esophegeal, brain   • [facility] LIEN 2D+3D SCREENING BILAT (CPT=77067/96203); Future    3. Asymptomatic menopausal state  - XR DEXA BONE DENSITOMETRY (CPT=77080); Future    4.  Laboratory exam ordered as part of routine general medical examination  - CBC WITH DIFFERENTIAL WITH PLATELE

## 2021-01-28 ENCOUNTER — HOSPITAL ENCOUNTER (OUTPATIENT)
Dept: BONE DENSITY | Age: 56
Discharge: HOME | End: 2021-01-28
Attending: FAMILY MEDICINE
Payer: COMMERCIAL

## 2021-01-28 DIAGNOSIS — Z78.0 ASYMPTOMATIC MENOPAUSAL STATE: ICD-10-CM

## 2021-01-28 PROCEDURE — 77080 DXA BONE DENSITY AXIAL: CPT | Performed by: FAMILY MEDICINE

## 2021-02-23 ENCOUNTER — HOSPITAL ENCOUNTER (OUTPATIENT)
Dept: MAMMOGRAPHY | Age: 56
Discharge: HOME | End: 2021-02-23
Attending: FAMILY MEDICINE
Payer: COMMERCIAL

## 2021-02-23 ENCOUNTER — LAB ENCOUNTER (OUTPATIENT)
Dept: LAB | Age: 56
End: 2021-02-23
Attending: FAMILY MEDICINE
Payer: COMMERCIAL

## 2021-02-23 DIAGNOSIS — Z12.31 ENCOUNTER FOR SCREENING MAMMOGRAM FOR MALIGNANT NEOPLASM OF BREAST: ICD-10-CM

## 2021-02-23 DIAGNOSIS — Z00.00 LABORATORY EXAM ORDERED AS PART OF ROUTINE GENERAL MEDICAL EXAMINATION: ICD-10-CM

## 2021-02-23 DIAGNOSIS — E55.9 VITAMIN D INSUFFICIENCY: ICD-10-CM

## 2021-02-23 LAB
ALBUMIN SERPL-MCNC: 4 G/DL (ref 3.4–5)
ALBUMIN/GLOB SERPL: 1.1 {RATIO} (ref 1–2)
ALP LIVER SERPL-CCNC: 72 U/L
ALT SERPL-CCNC: 36 U/L
ANION GAP SERPL CALC-SCNC: 6 MMOL/L (ref 0–18)
AST SERPL-CCNC: 19 U/L (ref 15–37)
BASOPHILS # BLD AUTO: 0.05 X10(3) UL (ref 0–0.2)
BASOPHILS NFR BLD AUTO: 1 %
BILIRUB SERPL-MCNC: 0.5 MG/DL (ref 0.1–2)
BUN BLD-MCNC: 10 MG/DL (ref 7–18)
BUN/CREAT SERPL: 13.5 (ref 10–20)
CALCIUM BLD-MCNC: 9.3 MG/DL (ref 8.5–10.1)
CHLORIDE SERPL-SCNC: 107 MMOL/L (ref 98–112)
CHOLEST SMN-MCNC: 194 MG/DL (ref ?–200)
CO2 SERPL-SCNC: 27 MMOL/L (ref 21–32)
CREAT BLD-MCNC: 0.74 MG/DL
DEPRECATED RDW RBC AUTO: 45 FL (ref 35.1–46.3)
EOSINOPHIL # BLD AUTO: 0.16 X10(3) UL (ref 0–0.7)
EOSINOPHIL NFR BLD AUTO: 3.2 %
ERYTHROCYTE [DISTWIDTH] IN BLOOD BY AUTOMATED COUNT: 13.3 % (ref 11–15)
GLOBULIN PLAS-MCNC: 3.7 G/DL (ref 2.8–4.4)
GLUCOSE BLD-MCNC: 96 MG/DL (ref 70–99)
HCT VFR BLD AUTO: 44 %
HDLC SERPL-MCNC: 76 MG/DL (ref 40–59)
HGB BLD-MCNC: 14.1 G/DL
IMM GRANULOCYTES # BLD AUTO: 0.01 X10(3) UL (ref 0–1)
IMM GRANULOCYTES NFR BLD: 0.2 %
LDLC SERPL CALC-MCNC: 99 MG/DL (ref ?–100)
LYMPHOCYTES # BLD AUTO: 1.44 X10(3) UL (ref 1–4)
LYMPHOCYTES NFR BLD AUTO: 28.6 %
M PROTEIN MFR SERPL ELPH: 7.7 G/DL (ref 6.4–8.2)
MCH RBC QN AUTO: 29.4 PG (ref 26–34)
MCHC RBC AUTO-ENTMCNC: 32 G/DL (ref 31–37)
MCV RBC AUTO: 91.9 FL
MONOCYTES # BLD AUTO: 0.47 X10(3) UL (ref 0.1–1)
MONOCYTES NFR BLD AUTO: 9.3 %
NEUTROPHILS # BLD AUTO: 2.91 X10 (3) UL (ref 1.5–7.7)
NEUTROPHILS # BLD AUTO: 2.91 X10(3) UL (ref 1.5–7.7)
NEUTROPHILS NFR BLD AUTO: 57.7 %
NONHDLC SERPL-MCNC: 118 MG/DL (ref ?–130)
OSMOLALITY SERPL CALC.SUM OF ELEC: 289 MOSM/KG (ref 275–295)
PATIENT FASTING Y/N/NP: YES
PATIENT FASTING Y/N/NP: YES
PLATELET # BLD AUTO: 294 10(3)UL (ref 150–450)
POTASSIUM SERPL-SCNC: 4.3 MMOL/L (ref 3.5–5.1)
RBC # BLD AUTO: 4.79 X10(6)UL
SODIUM SERPL-SCNC: 140 MMOL/L (ref 136–145)
TRIGL SERPL-MCNC: 96 MG/DL (ref 30–149)
VIT D+METAB SERPL-MCNC: 21.8 NG/ML (ref 30–100)
VLDLC SERPL CALC-MCNC: 19 MG/DL (ref 0–30)
WBC # BLD AUTO: 5 X10(3) UL (ref 4–11)

## 2021-02-23 PROCEDURE — 77063 BREAST TOMOSYNTHESIS BI: CPT | Performed by: FAMILY MEDICINE

## 2021-02-23 PROCEDURE — 80061 LIPID PANEL: CPT

## 2021-02-23 PROCEDURE — 82306 VITAMIN D 25 HYDROXY: CPT

## 2021-02-23 PROCEDURE — 77067 SCR MAMMO BI INCL CAD: CPT | Performed by: FAMILY MEDICINE

## 2021-02-23 PROCEDURE — 36415 COLL VENOUS BLD VENIPUNCTURE: CPT

## 2021-02-23 PROCEDURE — 80053 COMPREHEN METABOLIC PANEL: CPT

## 2021-02-23 PROCEDURE — 85025 COMPLETE CBC W/AUTO DIFF WBC: CPT

## 2021-05-10 ENCOUNTER — PATIENT MESSAGE (OUTPATIENT)
Dept: FAMILY MEDICINE CLINIC | Facility: CLINIC | Age: 56
End: 2021-05-10

## 2021-05-10 NOTE — TELEPHONE ENCOUNTER
From: Jey Major  To: Shimon Nunes MD  Sent: 5/10/2021 8:10 AM CDT  Subject: Non-Urgent Medical Question    Good Morning! I have a question, I received my [address] just over a month ago.  Is it normal that the arm I got it in still

## 2021-05-12 ENCOUNTER — HOSPITAL ENCOUNTER (OUTPATIENT)
Dept: GENERAL RADIOLOGY | Age: 56
Discharge: HOME | End: 2021-05-12
Attending: FAMILY MEDICINE
Payer: COMMERCIAL

## 2021-05-12 ENCOUNTER — OFFICE VISIT (OUTPATIENT)
Dept: FAMILY MEDICINE CLINIC | Facility: CLINIC | Age: 56
End: 2021-05-12

## 2021-05-12 VITALS
RESPIRATION RATE: 16 BRPM | HEART RATE: 96 BPM | SYSTOLIC BLOOD PRESSURE: 120 MMHG | BODY MASS INDEX: 32.4 KG/M2 | TEMPERATURE: 98 F | DIASTOLIC BLOOD PRESSURE: 78 MMHG | WEIGHT: 204 LBS | HEIGHT: 66.5 IN | OXYGEN SATURATION: 99 %

## 2021-05-12 DIAGNOSIS — M25.512 ACUTE PAIN OF LEFT SHOULDER: ICD-10-CM

## 2021-05-12 DIAGNOSIS — M54.2 NECK PAIN: ICD-10-CM

## 2021-05-12 DIAGNOSIS — M25.512 ACUTE PAIN OF LEFT SHOULDER: Primary | ICD-10-CM

## 2021-05-12 PROCEDURE — 3008F BODY MASS INDEX DOCD: CPT | Performed by: FAMILY MEDICINE

## 2021-05-12 PROCEDURE — 3074F SYST BP LT 130 MM HG: CPT | Performed by: FAMILY MEDICINE

## 2021-05-12 PROCEDURE — 73030 X-RAY EXAM OF SHOULDER: CPT | Performed by: FAMILY MEDICINE

## 2021-05-12 PROCEDURE — 99213 OFFICE O/P EST LOW 20 MIN: CPT | Performed by: FAMILY MEDICINE

## 2021-05-12 PROCEDURE — 3078F DIAST BP <80 MM HG: CPT | Performed by: FAMILY MEDICINE

## 2021-05-12 PROCEDURE — 72050 X-RAY EXAM NECK SPINE 4/5VWS: CPT | Performed by: FAMILY MEDICINE

## 2021-05-12 RX ORDER — METHYLPREDNISOLONE 4 MG/1
TABLET ORAL
Qty: 1 KIT | Refills: 0 | Status: SHIPPED | OUTPATIENT
Start: 2021-05-12 | End: 2021-05-19

## 2021-05-12 RX ORDER — COVID-19 MOLECULAR TEST ASSAY
1 KIT MISCELLANEOUS AS DIRECTED
COMMUNITY
Start: 2021-04-03 | End: 2021-05-12

## 2021-05-12 NOTE — PROGRESS NOTES
[facility] Group Visit Note  5/12/2021      Subjective:      Patient ID: Eliana Stanley is a 49year old female.     Chief Complaint:  Patient presents with:  Arm Pain: left arm pain off & on and limited mobility since getting the J & J vaccine on rotation, speeds test, Hawkin's, cross arm (mild reduction in abduction), Neer's (180 and painful past 90 degrees), pain with supraspinatus. Strength 5/5 of B UE. Has normal ROM of the neck except slightly reduced extension. Assessment:     1.  Acu

## 2021-05-19 ENCOUNTER — OFFICE VISIT (OUTPATIENT)
Dept: FAMILY MEDICINE CLINIC | Facility: CLINIC | Age: 56
End: 2021-05-19

## 2021-05-19 VITALS
HEART RATE: 97 BPM | HEIGHT: 66.5 IN | RESPIRATION RATE: 16 BRPM | TEMPERATURE: 98 F | BODY MASS INDEX: 32.4 KG/M2 | WEIGHT: 204 LBS | DIASTOLIC BLOOD PRESSURE: 72 MMHG | SYSTOLIC BLOOD PRESSURE: 118 MMHG | OXYGEN SATURATION: 98 %

## 2021-05-19 DIAGNOSIS — M47.812 ARTHROPATHY OF CERVICAL FACET JOINT: ICD-10-CM

## 2021-05-19 DIAGNOSIS — M50.30 DDD (DEGENERATIVE DISC DISEASE), CERVICAL: Primary | ICD-10-CM

## 2021-05-19 PROCEDURE — 3078F DIAST BP <80 MM HG: CPT | Performed by: FAMILY MEDICINE

## 2021-05-19 PROCEDURE — 99213 OFFICE O/P EST LOW 20 MIN: CPT | Performed by: FAMILY MEDICINE

## 2021-05-19 PROCEDURE — 3074F SYST BP LT 130 MM HG: CPT | Performed by: FAMILY MEDICINE

## 2021-05-19 PROCEDURE — 3008F BODY MASS INDEX DOCD: CPT | Performed by: FAMILY MEDICINE

## 2021-05-19 RX ORDER — MELOXICAM 15 MG/1
15 TABLET ORAL DAILY
Qty: 30 TABLET | Refills: 0 | Status: SHIPPED | OUTPATIENT
Start: 2021-05-19 | End: 2021-06-22

## 2021-05-19 NOTE — PROGRESS NOTES
[facility] Group Visit Note  5/19/2021      Subjective:      Patient ID: Jair Fernandez is a 49year old female. Chief Complaint:  Patient presents with: Follow - Up: 1 week f/u on left arm/shoulder pain.  States she can lift arm a little highe Height: 5' 6.5\" (1.689 m)       Physical Examination   General:  Alert, in no acute distress  HEENT: NCAT, EOMI, normal TMs, oropharynx, and nasal turbinates. Neck:  No cervical lymphadenopathy  CV: Regular rate and rhythm.  No murmurs, gallops, or rubs

## 2021-05-28 ENCOUNTER — OFFICE VISIT (OUTPATIENT)
Dept: FAMILY MEDICINE CLINIC | Facility: CLINIC | Age: 56
End: 2021-05-28

## 2021-05-28 VITALS
BODY MASS INDEX: 32.4 KG/M2 | WEIGHT: 204 LBS | RESPIRATION RATE: 16 BRPM | TEMPERATURE: 98 F | HEART RATE: 98 BPM | HEIGHT: 66.5 IN | SYSTOLIC BLOOD PRESSURE: 120 MMHG | OXYGEN SATURATION: 99 % | DIASTOLIC BLOOD PRESSURE: 70 MMHG

## 2021-05-28 DIAGNOSIS — M50.30 DDD (DEGENERATIVE DISC DISEASE), CERVICAL: Primary | ICD-10-CM

## 2021-05-28 DIAGNOSIS — M47.812 ARTHROPATHY OF CERVICAL FACET JOINT: ICD-10-CM

## 2021-05-28 PROCEDURE — 3078F DIAST BP <80 MM HG: CPT | Performed by: FAMILY MEDICINE

## 2021-05-28 PROCEDURE — 99214 OFFICE O/P EST MOD 30 MIN: CPT | Performed by: FAMILY MEDICINE

## 2021-05-28 PROCEDURE — 3008F BODY MASS INDEX DOCD: CPT | Performed by: FAMILY MEDICINE

## 2021-05-28 PROCEDURE — 3074F SYST BP LT 130 MM HG: CPT | Performed by: FAMILY MEDICINE

## 2021-05-28 RX ORDER — TRAMADOL HYDROCHLORIDE 50 MG/1
50 TABLET ORAL 2 TIMES DAILY PRN
Qty: 45 TABLET | Refills: 0 | Status: SHIPPED | OUTPATIENT
Start: 2021-05-28 | End: 2021-06-22

## 2021-05-28 RX ORDER — GABAPENTIN 300 MG/1
300 CAPSULE ORAL 2 TIMES DAILY PRN
Qty: 60 CAPSULE | Refills: 0 | Status: SHIPPED | OUTPATIENT
Start: 2021-05-28 | End: 2021-06-25

## 2021-05-28 NOTE — PROGRESS NOTES
[facility] Group Visit Note  5/28/2021      Subjective:      Patient ID: Baron Doyle is a 49year old female. Chief Complaint:  Patient presents with: Follow - Up:  left arm/shoulder pain.  The steroids and the meloxican helped the first cou cervical lymphadenopathy  CV: Regular rate and rhythm. No murmurs, gallops, or rubs.   Lungs:  Clear to auscultation B, no wheezes, rales, or rhonchi, normal respiratory effort  Abd:  +bowel sounds, soft  Ext:  No pedal edema,  Pedal pulses 2+ B  MS: L shou

## 2021-06-21 ENCOUNTER — APPOINTMENT (OUTPATIENT)
Dept: PHYSICAL THERAPY | Age: 56
End: 2021-06-21
Attending: FAMILY MEDICINE
Payer: COMMERCIAL

## 2021-06-22 ENCOUNTER — OFFICE VISIT (OUTPATIENT)
Dept: PHYSICAL THERAPY | Age: 56
End: 2021-06-22
Attending: FAMILY MEDICINE
Payer: COMMERCIAL

## 2021-06-22 DIAGNOSIS — M50.30 DDD (DEGENERATIVE DISC DISEASE), CERVICAL: ICD-10-CM

## 2021-06-22 DIAGNOSIS — M47.812 ARTHROPATHY OF CERVICAL FACET JOINT: ICD-10-CM

## 2021-06-22 PROCEDURE — 97162 PT EVAL MOD COMPLEX 30 MIN: CPT

## 2021-06-22 PROCEDURE — 97110 THERAPEUTIC EXERCISES: CPT

## 2021-06-22 NOTE — TELEPHONE ENCOUNTER
Requesting Meloxicam 15mg  LOV: 5/28/21  RTC: prn  Last Relevant Labs: 2/23/21  Filled: 5/19/21 #30 with 0 refills    Requesting Tramadol 50mg  LOV: 5/28/21  RTC: prn  Last Relevant Labs: 2/23/21  Filled: 5/28/21 #45 with 0 refills    Future Appointments

## 2021-06-22 NOTE — PROGRESS NOTES
SPINE EVALUATION:   Referring Physician: Dr. Satish Love  Diagnosis: Arthritis of facet joint of cervical spine ([facility] (M46.92)  DDD (degenerative disc disease),   cervical (M50.30)  Arthropathy of cervical facet joint (M47.812)  Left shoulder pain, unspecifie pathology, POC and HEP. Pt voiced understanding and performs HEP correctly without reported pain. Skilled Physical Therapy is medically necessary to address the above impairments and reach functional goals.      Precautions:  None  OBJECTIVE:   Observation on clinical rationale and outcome measures, this evaluation involved Moderate Complexity decision making due to 1-2 personal factors/comorbidities, 3 body structures involved/activity limitations, and evolving symptoms including changing pain levels.   PLAN and return this letter via fax as soon as possible to [phone].  If you have any questions, please contact me at Dept: [phone]    Sincerely,  Electronically signed by therapist: Serjio Madrid PT    Physician's certification required: Yes  I cert

## 2021-06-23 ENCOUNTER — APPOINTMENT (OUTPATIENT)
Dept: PHYSICAL THERAPY | Age: 56
End: 2021-06-23
Attending: FAMILY MEDICINE
Payer: COMMERCIAL

## 2021-06-23 DIAGNOSIS — M47.812 ARTHROPATHY OF CERVICAL FACET JOINT: ICD-10-CM

## 2021-06-23 DIAGNOSIS — M50.30 DDD (DEGENERATIVE DISC DISEASE), CERVICAL: ICD-10-CM

## 2021-06-23 RX ORDER — MELOXICAM 15 MG/1
15 TABLET ORAL DAILY PRN
Qty: 90 TABLET | Refills: 0 | Status: SHIPPED | OUTPATIENT
Start: 2021-06-23 | End: 2022-01-11 | Stop reason: ALTCHOICE

## 2021-06-23 RX ORDER — TRAMADOL HYDROCHLORIDE 50 MG/1
50 TABLET ORAL 2 TIMES DAILY PRN
Qty: 45 TABLET | Refills: 0 | Status: SHIPPED | OUTPATIENT
Start: 2021-06-23 | End: 2022-01-11 | Stop reason: ALTCHOICE

## 2021-06-23 RX ORDER — MELOXICAM 15 MG/1
15 TABLET ORAL DAILY
Qty: 30 TABLET | Refills: 0 | Status: SHIPPED | OUTPATIENT
Start: 2021-06-23 | End: 2021-06-23

## 2021-06-24 ENCOUNTER — APPOINTMENT (OUTPATIENT)
Dept: PHYSICAL THERAPY | Age: 56
End: 2021-06-24
Attending: FAMILY MEDICINE
Payer: COMMERCIAL

## 2021-06-24 ENCOUNTER — OFFICE VISIT (OUTPATIENT)
Dept: PHYSICAL THERAPY | Age: 56
End: 2021-06-24
Attending: FAMILY MEDICINE
Payer: COMMERCIAL

## 2021-06-24 DIAGNOSIS — M47.812 ARTHROPATHY OF CERVICAL FACET JOINT: ICD-10-CM

## 2021-06-24 DIAGNOSIS — M50.30 DDD (DEGENERATIVE DISC DISEASE), CERVICAL: ICD-10-CM

## 2021-06-24 PROCEDURE — 97140 MANUAL THERAPY 1/> REGIONS: CPT

## 2021-06-24 PROCEDURE — 97110 THERAPEUTIC EXERCISES: CPT

## 2021-06-24 NOTE — PROGRESS NOTES
Dx: Arthritis of facet joint of cervical spine (HCC) (M46.92)  DDD (degenerative disc disease),   cervical (M50.30)  Arthropathy of cervical facet joint (M47.812)  Left shoulder pain, unspecified chronicity (M25.512)           Insurance (Authorized # of Vi progress past HEP in clinic, focus on scapular ROM, rotation strengthening. Date: 6/24/2021  TX#: 2/8 auth, 12 planned Date:                 TX#: 3/ Date:                 TX#: 4/ Date:                 TX#: 5/ Date:    Tx#: 6/   There Ex: 30 min  SB rolling

## 2021-06-25 ENCOUNTER — APPOINTMENT (OUTPATIENT)
Dept: PHYSICAL THERAPY | Age: 56
End: 2021-06-25
Attending: FAMILY MEDICINE
Payer: COMMERCIAL

## 2021-06-25 DIAGNOSIS — M50.30 DDD (DEGENERATIVE DISC DISEASE), CERVICAL: ICD-10-CM

## 2021-06-25 DIAGNOSIS — M47.812 ARTHROPATHY OF CERVICAL FACET JOINT: ICD-10-CM

## 2021-06-25 RX ORDER — GABAPENTIN 300 MG/1
300 CAPSULE ORAL 2 TIMES DAILY PRN
Qty: 60 CAPSULE | Refills: 0 | Status: SHIPPED | OUTPATIENT
Start: 2021-06-25 | End: 2022-01-11 | Stop reason: ALTCHOICE

## 2021-06-25 NOTE — TELEPHONE ENCOUNTER
gabapentin 300 MG Oral Cap          Sig: Take 1 capsule (300 mg total) by mouth 2 (two) times daily as needed.     Disp:  60 capsule    Refills:  0    Start: 6/25/2021    Class: Normal    Non-formulary For: DDD (degenerative disc disease), cervical; Arthrop

## 2021-06-29 ENCOUNTER — OFFICE VISIT (OUTPATIENT)
Dept: PHYSICAL THERAPY | Age: 56
End: 2021-06-29
Attending: FAMILY MEDICINE
Payer: COMMERCIAL

## 2021-06-29 PROCEDURE — 97110 THERAPEUTIC EXERCISES: CPT

## 2021-06-29 PROCEDURE — 97140 MANUAL THERAPY 1/> REGIONS: CPT

## 2021-06-29 NOTE — PROGRESS NOTES
Dx: Arthritis of facet joint of cervical spine (HCC) (M46.92)  DDD (degenerative disc disease),   cervical (M50.30)  Arthropathy of cervical facet joint (M47.812)  Left shoulder pain, unspecified chronicity (M25.512)           Insurance (Authorized # of Vi promote upright posturing and decreased pain with overhead reaching.    · Pt will be independent and compliant with comprehensive HEP to maintain progress achieved in PT        Plan:focus on scapular ROM, rotation strengthenin and if sx allow progress to re

## 2021-07-02 ENCOUNTER — OFFICE VISIT (OUTPATIENT)
Dept: PHYSICAL THERAPY | Age: 56
End: 2021-07-02
Attending: FAMILY MEDICINE
Payer: COMMERCIAL

## 2021-07-02 PROCEDURE — 97140 MANUAL THERAPY 1/> REGIONS: CPT

## 2021-07-02 PROCEDURE — 97110 THERAPEUTIC EXERCISES: CPT

## 2021-07-02 NOTE — PROGRESS NOTES
Dx: Arthritis of facet joint of cervical spine (HCC) (M46.92)  DDD (degenerative disc disease),   cervical (M50.30)  Arthropathy of cervical facet joint (M47.812)  Left shoulder pain, unspecified chronicity (M25.512)           Insurance (Authorized # of Vi comprehensive HEP to maintain progress achieved in PT        Plan: HEP upgrade next visit  Date: 6/24/2021  TX#: 2/8 auth, 12 planned Date:   6/29/21              TX#: 3/8 auth, 12 planned  Date:    7/2/21             TX#: 4/8 auth, 12 planned Date:

## 2021-07-06 ENCOUNTER — OFFICE VISIT (OUTPATIENT)
Dept: PHYSICAL THERAPY | Age: 56
End: 2021-07-06
Attending: FAMILY MEDICINE
Payer: COMMERCIAL

## 2021-07-06 PROCEDURE — 97140 MANUAL THERAPY 1/> REGIONS: CPT

## 2021-07-06 PROCEDURE — 97110 THERAPEUTIC EXERCISES: CPT

## 2021-07-06 NOTE — PROGRESS NOTES
Dx: Arthritis of facet joint of cervical spine (HCC) (M46.92)  DDD (degenerative disc disease),   cervical (M50.30)  Arthropathy of cervical facet joint (M47.812)  Left shoulder pain, unspecified chronicity (M25.512)           Insurance (Authorized # of Vi will be independent and compliant with comprehensive HEP to maintain progress achieved in PT        Plan: progress past upgraded HEP in clinic. Progress scapular strength.   Date: 6/24/2021  TX#: 2/8 auth, 12 planned Date:   6/29/21              TX#: 3/8 carlito HEP: scalene stretch, thor ext in chair, supine shldr flex w 3lb, s/l shldr ER 3lb daily.     Charges: 2 ther ex, 1 manual       Total Timed Treatment: 40 min  Total Treatment Time: 40 min

## 2021-07-09 ENCOUNTER — OFFICE VISIT (OUTPATIENT)
Dept: PHYSICAL THERAPY | Age: 56
End: 2021-07-09
Attending: FAMILY MEDICINE
Payer: COMMERCIAL

## 2021-07-09 PROCEDURE — 97110 THERAPEUTIC EXERCISES: CPT

## 2021-07-09 PROCEDURE — 97140 MANUAL THERAPY 1/> REGIONS: CPT

## 2021-07-09 NOTE — PROGRESS NOTES
Dx: Arthritis of facet joint of cervical spine (HCC) (M46.92)  DDD (degenerative disc disease),   cervical (M50.30)  Arthropathy of cervical facet joint (M47.812)  Left shoulder pain, unspecified chronicity (M25.512)           Insurance (Authorized # of Vi thoracic PA mobility to WNL to improve cervical ROM as well as promote upright posturing and decreased pain with overhead reaching.    · Pt will be independent and compliant with comprehensive HEP to maintain progress achieved in PT        Plan: PN due to R T1-3 grade III; R JUANA marshall AP, distraction grade II Manual Therapy: 10 min  STM R> L SCM, UT, LS,scalenes,  s/o release, cerv traction, PA R T1-3 grade III; R JUANA marshall AP, inf distraction grade III/IV Manual Therapy: 10 min  STM L pec, R> L SCM, UT, LS,scalene

## 2021-07-13 ENCOUNTER — OFFICE VISIT (OUTPATIENT)
Dept: PHYSICAL THERAPY | Age: 56
End: 2021-07-13
Attending: FAMILY MEDICINE
Payer: COMMERCIAL

## 2021-07-13 PROCEDURE — 97110 THERAPEUTIC EXERCISES: CPT

## 2021-07-13 NOTE — PROGRESS NOTES
Discharge Summary  Pt has attended 7 visits in Physical Therapy.      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 L RTC strength now 5/5, and ER and abd testing no longer with pain. B parascapular strength increased to 4-/5 or greater (was < 3/5).   Highest neck/shdlr pain now 3/10 (was 8/10), and pt I certify the need for these services furnished under this plan of treatment and while under my care.     X___________________________________________________ Date____________________    Certification From: 6/39/6362  To:10/11/2021     Date:    7/2/21 halie stretch, thor ext in chair, wall I with lift, SA punch at wall, LS stretch    Charges: 3 ther ex     Total Timed Treatment: 44 min  Total Treatment Time: 44 min

## 2021-07-16 ENCOUNTER — APPOINTMENT (OUTPATIENT)
Dept: PHYSICAL THERAPY | Age: 56
End: 2021-07-16
Attending: FAMILY MEDICINE
Payer: COMMERCIAL

## 2021-08-03 ENCOUNTER — APPOINTMENT (OUTPATIENT)
Dept: PHYSICAL THERAPY | Age: 56
End: 2021-08-03
Attending: FAMILY MEDICINE
Payer: COMMERCIAL

## 2021-08-05 ENCOUNTER — APPOINTMENT (OUTPATIENT)
Dept: PHYSICAL THERAPY | Age: 56
End: 2021-08-05
Attending: FAMILY MEDICINE
Payer: COMMERCIAL

## 2021-08-10 ENCOUNTER — APPOINTMENT (OUTPATIENT)
Dept: PHYSICAL THERAPY | Age: 56
End: 2021-08-10
Attending: FAMILY MEDICINE
Payer: COMMERCIAL

## 2021-08-12 ENCOUNTER — APPOINTMENT (OUTPATIENT)
Dept: PHYSICAL THERAPY | Age: 56
End: 2021-08-12
Attending: FAMILY MEDICINE
Payer: COMMERCIAL

## 2022-01-04 RX ORDER — LEVOFLOXACIN 500 MG/1
500 TABLET, FILM COATED ORAL DAILY
Qty: 10 TABLET | Refills: 0 | Status: SHIPPED | OUTPATIENT
Start: 2022-01-04 | End: 2022-01-11 | Stop reason: ALTCHOICE

## 2022-01-04 RX ORDER — METRONIDAZOLE 250 MG/1
250 TABLET ORAL 3 TIMES DAILY
Qty: 30 TABLET | Refills: 0 | Status: SHIPPED | OUTPATIENT
Start: 2022-01-04 | End: 2022-01-11 | Stop reason: ALTCHOICE

## 2022-01-11 ENCOUNTER — OFFICE VISIT (OUTPATIENT)
Dept: FAMILY MEDICINE CLINIC | Facility: CLINIC | Age: 57
End: 2022-01-11
Payer: COMMERCIAL

## 2022-01-11 ENCOUNTER — LAB ENCOUNTER (OUTPATIENT)
Dept: LAB | Age: 57
End: 2022-01-11
Attending: FAMILY MEDICINE
Payer: COMMERCIAL

## 2022-01-11 VITALS
DIASTOLIC BLOOD PRESSURE: 72 MMHG | SYSTOLIC BLOOD PRESSURE: 112 MMHG | WEIGHT: 203 LBS | OXYGEN SATURATION: 98 % | HEART RATE: 90 BPM | RESPIRATION RATE: 16 BRPM | TEMPERATURE: 98 F | HEIGHT: 66.5 IN | BODY MASS INDEX: 32.24 KG/M2

## 2022-01-11 DIAGNOSIS — Z12.11 COLON CANCER SCREENING: ICD-10-CM

## 2022-01-11 DIAGNOSIS — Z00.00 ROUTINE MEDICAL EXAM: Primary | ICD-10-CM

## 2022-01-11 DIAGNOSIS — E55.9 VITAMIN D INSUFFICIENCY: ICD-10-CM

## 2022-01-11 DIAGNOSIS — Z80.3 FAMILY HISTORY OF BREAST CANCER IN FIRST DEGREE RELATIVE: ICD-10-CM

## 2022-01-11 DIAGNOSIS — Z12.31 ENCOUNTER FOR SCREENING MAMMOGRAM FOR MALIGNANT NEOPLASM OF BREAST: ICD-10-CM

## 2022-01-11 DIAGNOSIS — Z00.00 LABORATORY EXAM ORDERED AS PART OF ROUTINE GENERAL MEDICAL EXAMINATION: ICD-10-CM

## 2022-01-11 DIAGNOSIS — Z23 NEED FOR VACCINATION: ICD-10-CM

## 2022-01-11 LAB
ALBUMIN SERPL-MCNC: 4 G/DL (ref 3.4–5)
ALBUMIN/GLOB SERPL: 1 {RATIO} (ref 1–2)
ALP LIVER SERPL-CCNC: 86 U/L
ALT SERPL-CCNC: 50 U/L
ANION GAP SERPL CALC-SCNC: 5 MMOL/L (ref 0–18)
AST SERPL-CCNC: 27 U/L (ref 15–37)
BASOPHILS # BLD AUTO: 0.07 X10(3) UL (ref 0–0.2)
BASOPHILS NFR BLD AUTO: 1 %
BILIRUB SERPL-MCNC: 0.4 MG/DL (ref 0.1–2)
BUN BLD-MCNC: 9 MG/DL (ref 7–18)
CALCIUM BLD-MCNC: 9.6 MG/DL (ref 8.5–10.1)
CHLORIDE SERPL-SCNC: 105 MMOL/L (ref 98–112)
CHOLEST SERPL-MCNC: 180 MG/DL (ref ?–200)
CO2 SERPL-SCNC: 30 MMOL/L (ref 21–32)
CREAT BLD-MCNC: 0.78 MG/DL
EOSINOPHIL # BLD AUTO: 0.16 X10(3) UL (ref 0–0.7)
EOSINOPHIL NFR BLD AUTO: 2.3 %
ERYTHROCYTE [DISTWIDTH] IN BLOOD BY AUTOMATED COUNT: 13.7 %
FASTING PATIENT LIPID ANSWER: YES
FASTING STATUS PATIENT QL REPORTED: YES
GLOBULIN PLAS-MCNC: 3.9 G/DL (ref 2.8–4.4)
GLUCOSE BLD-MCNC: 90 MG/DL (ref 70–99)
HCT VFR BLD AUTO: 47.2 %
HDLC SERPL-MCNC: 65 MG/DL (ref 40–59)
HGB BLD-MCNC: 14.8 G/DL
IMM GRANULOCYTES # BLD AUTO: 0.03 X10(3) UL (ref 0–1)
IMM GRANULOCYTES NFR BLD: 0.4 %
LDLC SERPL CALC-MCNC: 91 MG/DL (ref ?–100)
LYMPHOCYTES # BLD AUTO: 1.66 X10(3) UL (ref 1–4)
LYMPHOCYTES NFR BLD AUTO: 23.9 %
MCH RBC QN AUTO: 28.7 PG (ref 26–34)
MCHC RBC AUTO-ENTMCNC: 31.4 G/DL (ref 31–37)
MCV RBC AUTO: 91.7 FL
MONOCYTES # BLD AUTO: 0.54 X10(3) UL (ref 0.1–1)
MONOCYTES NFR BLD AUTO: 7.8 %
NEUTROPHILS # BLD AUTO: 4.48 X10 (3) UL (ref 1.5–7.7)
NEUTROPHILS # BLD AUTO: 4.48 X10(3) UL (ref 1.5–7.7)
NEUTROPHILS NFR BLD AUTO: 64.6 %
NONHDLC SERPL-MCNC: 115 MG/DL (ref ?–130)
OSMOLALITY SERPL CALC.SUM OF ELEC: 288 MOSM/KG (ref 275–295)
PLATELET # BLD AUTO: 336 10(3)UL (ref 150–450)
POTASSIUM SERPL-SCNC: 4.9 MMOL/L (ref 3.5–5.1)
PROT SERPL-MCNC: 7.9 G/DL (ref 6.4–8.2)
RBC # BLD AUTO: 5.15 X10(6)UL
SODIUM SERPL-SCNC: 140 MMOL/L (ref 136–145)
TRIGL SERPL-MCNC: 136 MG/DL (ref 30–149)
VIT D+METAB SERPL-MCNC: 23.2 NG/ML (ref 30–100)
VLDLC SERPL CALC-MCNC: 22 MG/DL (ref 0–30)
WBC # BLD AUTO: 6.9 X10(3) UL (ref 4–11)

## 2022-01-11 PROCEDURE — 3074F SYST BP LT 130 MM HG: CPT | Performed by: FAMILY MEDICINE

## 2022-01-11 PROCEDURE — 90471 IMMUNIZATION ADMIN: CPT | Performed by: FAMILY MEDICINE

## 2022-01-11 PROCEDURE — 3078F DIAST BP <80 MM HG: CPT | Performed by: FAMILY MEDICINE

## 2022-01-11 PROCEDURE — 80061 LIPID PANEL: CPT

## 2022-01-11 PROCEDURE — 90686 IIV4 VACC NO PRSV 0.5 ML IM: CPT | Performed by: FAMILY MEDICINE

## 2022-01-11 PROCEDURE — G0438 PPPS, INITIAL VISIT: HCPCS | Performed by: FAMILY MEDICINE

## 2022-01-11 PROCEDURE — 3008F BODY MASS INDEX DOCD: CPT | Performed by: FAMILY MEDICINE

## 2022-01-11 PROCEDURE — 80053 COMPREHEN METABOLIC PANEL: CPT

## 2022-01-11 PROCEDURE — 85025 COMPLETE CBC W/AUTO DIFF WBC: CPT

## 2022-01-11 PROCEDURE — 99396 PREV VISIT EST AGE 40-64: CPT | Performed by: FAMILY MEDICINE

## 2022-01-11 PROCEDURE — 36415 COLL VENOUS BLD VENIPUNCTURE: CPT

## 2022-01-11 PROCEDURE — 82306 VITAMIN D 25 HYDROXY: CPT

## 2022-01-11 RX ORDER — CLINDAMYCIN PHOSPHATE 10 MG/ML
1 LOTION TOPICAL 2 TIMES DAILY
Qty: 60 ML | Refills: 0 | Status: SHIPPED | OUTPATIENT
Start: 2022-01-11

## 2022-01-11 NOTE — PROGRESS NOTES
Patient presents with:  Physical  Immunization/Injection: influenza vaccine today      HPI:  Jhon Minor is a 49year old female here today for preventative visit. Imms- up to date with Prema Solis + booster, PPSV 23, and tdap.  Will discuss flu, sh tablet by mouth daily. , Disp: , Rfl:   Calcium Polycarbophil (FIBER) 625 MG Oral Tab, Take by mouth., Disp: , Rfl:   Acidophilus/Pectin Oral Cap, Take 1 capsule by mouth daily. , Disp: , Rfl:   Ergocalciferol (VITAMIN D OR), Take by mouth daily.   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 Vitamin D insufficiency  - VITAMIN D; Future        Patient verbalized understanding and agrees to plan.       Return in about 1 year (around 1/

## 2022-02-24 ENCOUNTER — PATIENT MESSAGE (OUTPATIENT)
Dept: FAMILY MEDICINE CLINIC | Facility: CLINIC | Age: 57
End: 2022-02-24

## 2022-02-24 ENCOUNTER — HOSPITAL ENCOUNTER (OUTPATIENT)
Dept: MAMMOGRAPHY | Age: 57
Discharge: HOME | End: 2022-02-24
Attending: FAMILY MEDICINE
Payer: COMMERCIAL

## 2022-02-24 DIAGNOSIS — Z12.31 ENCOUNTER FOR SCREENING MAMMOGRAM FOR MALIGNANT NEOPLASM OF BREAST: ICD-10-CM

## 2022-02-24 DIAGNOSIS — Z80.3 FAMILY HISTORY OF BREAST CANCER IN FIRST DEGREE RELATIVE: ICD-10-CM

## 2022-02-24 PROCEDURE — 77063 BREAST TOMOSYNTHESIS BI: CPT | Performed by: FAMILY MEDICINE

## 2022-02-24 PROCEDURE — 77067 SCR MAMMO BI INCL CAD: CPT | Performed by: FAMILY MEDICINE

## 2022-02-24 NOTE — TELEPHONE ENCOUNTER
From: Latasha Major  To: Juan Antonio Mayfield MD  Sent: 2/24/2022 9:29 AM CST  Subject: Mammogram     Good Morning  I had my mammogram this am and it came back negative, but it did mention that I should be evaluated by the [address] [place] [address]? Should I be calling them to set up an appointment?

## 2022-08-18 NOTE — PATIENT INSTRUCTIONS
Prevention Guidelines, Women Ages 48 to 59  Screening tests and vaccines are an important part of managing your health. A screening test is done to find possible disorders or diseases in people who don't have any symptoms.  The goal is to find a disease hysterectomy Pap test every 3 years or Pap test with human papillomavirus (HPV) test every 5 years   Chlamydia Women who are sexually active and at increased risk for infection At yearly routine exams   Colorectal cancer All women at average risk in this a (LDCT); talk with your healthcare provider about your risks and situation   Obesity All women in this age group At yearly routine exams   Osteoporosis Women who are postmenopausal Talk with your healthcare provider   Syphilis Women at increased risk for in against 23 types of pneumococcal bacteria)  Talk with your healthcare provider   Tetanus/diphtheria/pertussis (Td/Tdap) booster All women in this age group Td every 10 years, or a 1-time dose of Tdap instead of a Td booster after age 25, then Td every 8 y Heterosexual

## 2022-10-21 ENCOUNTER — HOSPITAL ENCOUNTER (INPATIENT)
Facility: HOSPITAL | Age: 57
LOS: 3 days | Discharge: HOME | End: 2022-10-24
Attending: EMERGENCY MEDICINE | Admitting: HOSPITALIST
Payer: COMMERCIAL

## 2022-10-21 ENCOUNTER — APPOINTMENT (OUTPATIENT)
Dept: CT IMAGING | Age: 57
End: 2022-10-21
Attending: EMERGENCY MEDICINE
Payer: COMMERCIAL

## 2022-10-21 DIAGNOSIS — K57.92 ACUTE DIVERTICULITIS: Primary | ICD-10-CM

## 2022-10-21 PROBLEM — K57.30 DIVERTICULA OF COLON: Status: ACTIVE | Noted: 2022-10-21

## 2022-10-21 LAB
ALBUMIN SERPL-MCNC: 3.5 G/DL (ref 3.4–5)
ALBUMIN/GLOB SERPL: 0.8 {RATIO} (ref 1–2)
ALP LIVER SERPL-CCNC: 79 U/L
ALT SERPL-CCNC: 23 U/L
ANION GAP SERPL CALC-SCNC: 6 MMOL/L (ref 0–18)
AST SERPL-CCNC: 15 U/L (ref 15–37)
BASOPHILS # BLD AUTO: 0.03 X10(3) UL (ref 0–0.2)
BASOPHILS NFR BLD AUTO: 0.3 %
BILIRUB SERPL-MCNC: 0.6 MG/DL (ref 0.1–2)
BILIRUB UR QL CFM: NEGATIVE
BUN BLD-MCNC: 8 MG/DL (ref 7–18)
CALCIUM BLD-MCNC: 9.1 MG/DL (ref 8.5–10.1)
CHLORIDE SERPL-SCNC: 105 MMOL/L (ref 98–112)
CLARITY UR REFRACT.AUTO: CLEAR
CO2 SERPL-SCNC: 26 MMOL/L (ref 21–32)
COLOR UR AUTO: YELLOW
CREAT BLD-MCNC: 0.79 MG/DL
EOSINOPHIL # BLD AUTO: 0.15 X10(3) UL (ref 0–0.7)
EOSINOPHIL NFR BLD AUTO: 1.5 %
ERYTHROCYTE [DISTWIDTH] IN BLOOD BY AUTOMATED COUNT: 13.2 %
GFR SERPLBLD BASED ON 1.73 SQ M-ARVRAT: 88 ML/MIN/1.73M2 (ref 60–?)
GLOBULIN PLAS-MCNC: 4.5 G/DL (ref 2.8–4.4)
GLUCOSE BLD-MCNC: 114 MG/DL (ref 70–99)
GLUCOSE UR STRIP.AUTO-MCNC: NEGATIVE MG/DL
HCT VFR BLD AUTO: 40.3 %
HGB BLD-MCNC: 13.4 G/DL
IMM GRANULOCYTES # BLD AUTO: 0.03 X10(3) UL (ref 0–1)
IMM GRANULOCYTES NFR BLD: 0.3 %
KETONES UR STRIP.AUTO-MCNC: 15 MG/DL
LYMPHOCYTES # BLD AUTO: 0.94 X10(3) UL (ref 1–4)
LYMPHOCYTES NFR BLD AUTO: 9.6 %
MCH RBC QN AUTO: 29.4 PG (ref 26–34)
MCHC RBC AUTO-ENTMCNC: 33.3 G/DL (ref 31–37)
MCV RBC AUTO: 88.4 FL
MONOCYTES # BLD AUTO: 0.77 X10(3) UL (ref 0.1–1)
MONOCYTES NFR BLD AUTO: 7.9 %
NEUTROPHILS # BLD AUTO: 7.88 X10 (3) UL (ref 1.5–7.7)
NEUTROPHILS # BLD AUTO: 7.88 X10(3) UL (ref 1.5–7.7)
NEUTROPHILS NFR BLD AUTO: 80.4 %
NITRITE UR QL STRIP.AUTO: NEGATIVE
OSMOLALITY SERPL CALC.SUM OF ELEC: 283 MOSM/KG (ref 275–295)
PH UR STRIP.AUTO: 7.5 [PH] (ref 5–8)
PLATELET # BLD AUTO: 304 10(3)UL (ref 150–450)
POTASSIUM SERPL-SCNC: 4 MMOL/L (ref 3.5–5.1)
PROT SERPL-MCNC: 8 G/DL (ref 6.4–8.2)
PROT UR STRIP.AUTO-MCNC: NEGATIVE MG/DL
RBC # BLD AUTO: 4.56 X10(6)UL
RBC UR QL AUTO: NEGATIVE
SARS-COV-2 RNA RESP QL NAA+PROBE: NOT DETECTED
SODIUM SERPL-SCNC: 137 MMOL/L (ref 136–145)
SP GR UR STRIP.AUTO: 1.02 (ref 1–1.03)
UROBILINOGEN UR STRIP.AUTO-MCNC: 0.2 MG/DL
WBC # BLD AUTO: 9.8 X10(3) UL (ref 4–11)

## 2022-10-21 PROCEDURE — 99223 1ST HOSP IP/OBS HIGH 75: CPT | Performed by: INTERNAL MEDICINE

## 2022-10-21 PROCEDURE — 74177 CT ABD & PELVIS W/CONTRAST: CPT | Performed by: EMERGENCY MEDICINE

## 2022-10-21 RX ORDER — SENNOSIDES 8.6 MG
17.2 TABLET ORAL NIGHTLY PRN
Status: DISCONTINUED | OUTPATIENT
Start: 2022-10-21 | End: 2022-10-24

## 2022-10-21 RX ORDER — PROCHLORPERAZINE EDISYLATE 5 MG/ML
5 INJECTION INTRAMUSCULAR; INTRAVENOUS EVERY 8 HOURS PRN
Status: DISCONTINUED | OUTPATIENT
Start: 2022-10-21 | End: 2022-10-24

## 2022-10-21 RX ORDER — ONDANSETRON 2 MG/ML
4 INJECTION INTRAMUSCULAR; INTRAVENOUS ONCE
Status: COMPLETED | OUTPATIENT
Start: 2022-10-21 | End: 2022-10-21

## 2022-10-21 RX ORDER — DIPHENHYDRAMINE HCL 25 MG
25 CAPSULE ORAL EVERY 4 HOURS PRN
Status: DISCONTINUED | OUTPATIENT
Start: 2022-10-21 | End: 2022-10-24

## 2022-10-21 RX ORDER — ONDANSETRON 2 MG/ML
4 INJECTION INTRAMUSCULAR; INTRAVENOUS EVERY 4 HOURS PRN
Status: DISCONTINUED | OUTPATIENT
Start: 2022-10-21 | End: 2022-10-21

## 2022-10-21 RX ORDER — ENOXAPARIN SODIUM 100 MG/ML
40 INJECTION SUBCUTANEOUS DAILY
Status: DISCONTINUED | OUTPATIENT
Start: 2022-10-21 | End: 2022-10-24

## 2022-10-21 RX ORDER — POLYETHYLENE GLYCOL 3350 17 G/17G
17 POWDER, FOR SOLUTION ORAL DAILY PRN
Status: DISCONTINUED | OUTPATIENT
Start: 2022-10-21 | End: 2022-10-24

## 2022-10-21 RX ORDER — HYDROCODONE BITARTRATE AND ACETAMINOPHEN 5; 325 MG/1; MG/1
2 TABLET ORAL EVERY 4 HOURS PRN
Status: DISCONTINUED | OUTPATIENT
Start: 2022-10-21 | End: 2022-10-24

## 2022-10-21 RX ORDER — ACETAMINOPHEN 325 MG/1
650 TABLET ORAL EVERY 4 HOURS PRN
Status: DISCONTINUED | OUTPATIENT
Start: 2022-10-21 | End: 2022-10-24

## 2022-10-21 RX ORDER — KETOROLAC TROMETHAMINE 15 MG/ML
15 INJECTION, SOLUTION INTRAMUSCULAR; INTRAVENOUS EVERY 6 HOURS PRN
Status: DISCONTINUED | OUTPATIENT
Start: 2022-10-21 | End: 2022-10-24

## 2022-10-21 RX ORDER — KETOROLAC TROMETHAMINE 15 MG/ML
15 INJECTION, SOLUTION INTRAMUSCULAR; INTRAVENOUS ONCE
Status: COMPLETED | OUTPATIENT
Start: 2022-10-21 | End: 2022-10-21

## 2022-10-21 RX ORDER — LEVOFLOXACIN 5 MG/ML
750 INJECTION, SOLUTION INTRAVENOUS EVERY 24 HOURS
Status: DISCONTINUED | OUTPATIENT
Start: 2022-10-22 | End: 2022-10-24

## 2022-10-21 RX ORDER — DEXTROSE AND SODIUM CHLORIDE 5; .45 G/100ML; G/100ML
INJECTION, SOLUTION INTRAVENOUS CONTINUOUS
Status: DISCONTINUED | OUTPATIENT
Start: 2022-10-21 | End: 2022-10-21

## 2022-10-21 RX ORDER — HYDROCODONE BITARTRATE AND ACETAMINOPHEN 5; 325 MG/1; MG/1
1 TABLET ORAL EVERY 4 HOURS PRN
Status: DISCONTINUED | OUTPATIENT
Start: 2022-10-21 | End: 2022-10-24

## 2022-10-21 RX ORDER — MORPHINE SULFATE 2 MG/ML
1 INJECTION, SOLUTION INTRAMUSCULAR; INTRAVENOUS EVERY 2 HOUR PRN
Status: DISCONTINUED | OUTPATIENT
Start: 2022-10-21 | End: 2022-10-24

## 2022-10-21 RX ORDER — SODIUM CHLORIDE 9 MG/ML
INJECTION, SOLUTION INTRAVENOUS CONTINUOUS
Status: DISCONTINUED | OUTPATIENT
Start: 2022-10-21 | End: 2022-10-21

## 2022-10-21 RX ORDER — MORPHINE SULFATE 4 MG/ML
4 INJECTION, SOLUTION INTRAMUSCULAR; INTRAVENOUS EVERY 2 HOUR PRN
Status: DISCONTINUED | OUTPATIENT
Start: 2022-10-21 | End: 2022-10-24

## 2022-10-21 RX ORDER — MELATONIN
3 NIGHTLY PRN
Status: DISCONTINUED | OUTPATIENT
Start: 2022-10-21 | End: 2022-10-24

## 2022-10-21 RX ORDER — METRONIDAZOLE 500 MG/100ML
500 INJECTION, SOLUTION INTRAVENOUS EVERY 8 HOURS
Status: DISCONTINUED | OUTPATIENT
Start: 2022-10-21 | End: 2022-10-24

## 2022-10-21 RX ORDER — MORPHINE SULFATE 2 MG/ML
2 INJECTION, SOLUTION INTRAMUSCULAR; INTRAVENOUS EVERY 2 HOUR PRN
Status: DISCONTINUED | OUTPATIENT
Start: 2022-10-21 | End: 2022-10-24

## 2022-10-21 RX ORDER — ONDANSETRON 2 MG/ML
4 INJECTION INTRAMUSCULAR; INTRAVENOUS EVERY 6 HOURS PRN
Status: DISCONTINUED | OUTPATIENT
Start: 2022-10-21 | End: 2022-10-24

## 2022-10-21 RX ORDER — METRONIDAZOLE 500 MG/100ML
500 INJECTION, SOLUTION INTRAVENOUS ONCE
Status: COMPLETED | OUTPATIENT
Start: 2022-10-21 | End: 2022-10-21

## 2022-10-21 RX ORDER — DEXTROSE AND SODIUM CHLORIDE 5; .9 G/100ML; G/100ML
INJECTION, SOLUTION INTRAVENOUS CONTINUOUS
Status: DISCONTINUED | OUTPATIENT
Start: 2022-10-21 | End: 2022-10-23

## 2022-10-21 RX ORDER — LEVOFLOXACIN 5 MG/ML
500 INJECTION, SOLUTION INTRAVENOUS ONCE
Status: COMPLETED | OUTPATIENT
Start: 2022-10-21 | End: 2022-10-21

## 2022-10-21 RX ORDER — BISACODYL 10 MG
10 SUPPOSITORY, RECTAL RECTAL
Status: DISCONTINUED | OUTPATIENT
Start: 2022-10-21 | End: 2022-10-24

## 2022-10-21 RX ORDER — DIPHENHYDRAMINE HYDROCHLORIDE 50 MG/ML
12.5 INJECTION INTRAMUSCULAR; INTRAVENOUS EVERY 4 HOURS PRN
Status: DISCONTINUED | OUTPATIENT
Start: 2022-10-21 | End: 2022-10-24

## 2022-10-21 RX ORDER — SODIUM PHOSPHATE, DIBASIC AND SODIUM PHOSPHATE, MONOBASIC 7; 19 G/133ML; G/133ML
1 ENEMA RECTAL ONCE AS NEEDED
Status: DISCONTINUED | OUTPATIENT
Start: 2022-10-21 | End: 2022-10-24

## 2022-10-21 NOTE — ED QUICK NOTES
Orders for admission, patient is aware of plan and ready to go upstairs. Any questions, please call ED AMY Reeves  at extension [phone]. Vaccinated? yes  Type of COVID test sent:rapid  COVID Suspicion level: Low- negative test      Titratable drug(s) infusing:n/a  Rate:  n/a  LOC at time of transport:aaox4    Other pertinent information:    CIWA score=  NIH score=

## 2022-10-21 NOTE — PROGRESS NOTES
The patient was seen and examined by general surgery. Full consult note to follow. The patient is well known to our services for diverticulitis. She had been doing well until late Wednesday night when she experienced left-sided abdominal pain with associated nausea. The patient states she know her pain was similar to her previous attack in 2017. She was concerned that her pain was persistent and presented to [facility] emergency department. On exam the patient is tender to palpation in the left abdomen. No peritoneal signs.      Plan:  - No plan for urgent or emergent surgical intervention at this time  - NPO  - Continue IV antibiotics  - Continue IV fluids  - Encourage ambulation and up to chair  - DVT prophylaxis with Lovenox    Toyin Lorenzo PA-C

## 2022-10-21 NOTE — ED QUICK NOTES
Pt updated on the plan of care, waiting for antibiotics to finish and then her  will drive her to [facility] for admission

## 2022-10-21 NOTE — PLAN OF CARE
Pt a&o x 4. Cheerful & cooperative w/ care   @ bedside. Participating & helpful with plan of care  Strict npo  Toradol for pain  Zofran for nausea  Benadryl for rash to right thigh & right arm  Up ad ratna. Pt has good safety awareness              Problem: Patient/Family Goals  Goal: Patient/Family Long Term Goal  Description: Patient's Long Term Goal: dischrge    Interventions:  - strict npo, ivf's pain & nausea meds. Benadryl for rash. Iv abx's  - See additional Care Plan goals for specific interventions  Outcome: Progressing  Goal: Patient/Family Short Term Goal  Description: Patient's Short Term Goal: discharge    Interventions:   - strict npo, ivf's pain & nausea meds. Benadryl for rash.   Iv abx's  - See additional Care Plan goals for specific interventions  Outcome: Progressing     Problem: GASTROINTESTINAL - ADULT  Goal: Minimal or absence of nausea and vomiting  Description: INTERVENTIONS:  - Maintain adequate hydration with IV or PO as ordered and tolerated  - Nasogastric tube to low intermittent suction as ordered  - Evaluate effectiveness of ordered antiemetic medications  - Provide nonpharmacologic comfort measures as appropriate  - Advance diet as tolerated, if ordered  - Obtain nutritional consult as needed  - Evaluate fluid balance  Outcome: Progressing  Goal: Maintains or returns to baseline bowel function  Description: INTERVENTIONS:  - Assess bowel function  - Maintain adequate hydration with IV or PO as ordered and tolerated  - Evaluate effectiveness of GI medications  - Encourage mobilization and activity  - Obtain nutritional consult as needed  - Establish a toileting routine/schedule  - Consider collaborating with pharmacy to review patient's medication profile  Outcome: Progressing  Goal: Maintains adequate nutritional intake (undernourished)  Description: INTERVENTIONS:  - Monitor percentage of each meal consumed  - Identify factors contributing to decreased intake, treat as appropriate  - Assist with meals as needed  - Monitor I&O, WT and lab values  - Obtain nutritional consult as needed  - Optimize oral hygiene and moisture  - Encourage food from home; allow for food preferences  - Enhance eating environment  Outcome: Progressing  Goal: Achieves appropriate nutritional intake (bariatric)  Description: INTERVENTIONS:  - Monitor for over-consumption  - Identify factors contributing to increased intake, treat as appropriate  - Monitor I&O, WT and lab values  - Obtain nutritional consult as needed  - Evaluate psychosocial factors contributing to over-consumption  Outcome: Progressing     Problem: SKIN/TISSUE INTEGRITY - ADULT  Goal: Skin integrity remains intact  Description: INTERVENTIONS  - Assess and document risk factors for pressure ulcer development  - Assess and document skin integrity  - Monitor for areas of redness and/or skin breakdown  - Initiate interventions, skin care algorithm/standards of care as needed  Outcome: Progressing     Problem: PAIN - ADULT  Goal: Verbalizes/displays adequate comfort level or patient's stated pain goal  Description: INTERVENTIONS:  - Encourage pt to monitor pain and request assistance  - Assess pain using appropriate pain scale  - Administer analgesics based on type and severity of pain and evaluate response  - Implement non-pharmacological measures as appropriate and evaluate response  - Consider cultural and social influences on pain and pain management  - Manage/alleviate anxiety  - Utilize distraction and/or relaxation techniques  - Monitor for opioid side effects  - Notify MD/LIP if interventions unsuccessful or patient reports new pain  - Anticipate increased pain with activity and pre-medicate as appropriate  Outcome: Progressing

## 2022-10-22 LAB
ANION GAP SERPL CALC-SCNC: 7 MMOL/L (ref 0–18)
BASOPHILS # BLD AUTO: 0.03 X10(3) UL (ref 0–0.2)
BASOPHILS NFR BLD AUTO: 0.6 %
BUN BLD-MCNC: 8 MG/DL (ref 7–18)
CALCIUM BLD-MCNC: 8.5 MG/DL (ref 8.5–10.1)
CHLORIDE SERPL-SCNC: 109 MMOL/L (ref 98–112)
CO2 SERPL-SCNC: 24 MMOL/L (ref 21–32)
CREAT BLD-MCNC: 0.57 MG/DL
EOSINOPHIL # BLD AUTO: 0.15 X10(3) UL (ref 0–0.7)
EOSINOPHIL NFR BLD AUTO: 3.1 %
ERYTHROCYTE [DISTWIDTH] IN BLOOD BY AUTOMATED COUNT: 12.6 %
GFR SERPLBLD BASED ON 1.73 SQ M-ARVRAT: 107 ML/MIN/1.73M2 (ref 60–?)
GLUCOSE BLD-MCNC: 102 MG/DL (ref 70–99)
HCT VFR BLD AUTO: 37.9 %
HGB BLD-MCNC: 12.3 G/DL
IMM GRANULOCYTES # BLD AUTO: 0.01 X10(3) UL (ref 0–1)
IMM GRANULOCYTES NFR BLD: 0.2 %
LYMPHOCYTES # BLD AUTO: 0.91 X10(3) UL (ref 1–4)
LYMPHOCYTES NFR BLD AUTO: 18.9 %
MCH RBC QN AUTO: 29.9 PG (ref 26–34)
MCHC RBC AUTO-ENTMCNC: 32.5 G/DL (ref 31–37)
MCV RBC AUTO: 92.2 FL
MONOCYTES # BLD AUTO: 0.52 X10(3) UL (ref 0.1–1)
MONOCYTES NFR BLD AUTO: 10.8 %
NEUTROPHILS # BLD AUTO: 3.2 X10 (3) UL (ref 1.5–7.7)
NEUTROPHILS # BLD AUTO: 3.2 X10(3) UL (ref 1.5–7.7)
NEUTROPHILS NFR BLD AUTO: 66.4 %
OSMOLALITY SERPL CALC.SUM OF ELEC: 289 MOSM/KG (ref 275–295)
PLATELET # BLD AUTO: 239 10(3)UL (ref 150–450)
POTASSIUM SERPL-SCNC: 3.5 MMOL/L (ref 3.5–5.1)
RBC # BLD AUTO: 4.11 X10(6)UL
SODIUM SERPL-SCNC: 140 MMOL/L (ref 136–145)
WBC # BLD AUTO: 4.8 X10(3) UL (ref 4–11)

## 2022-10-22 PROCEDURE — 99232 SBSQ HOSP IP/OBS MODERATE 35: CPT | Performed by: SURGERY

## 2022-10-22 PROCEDURE — 99232 SBSQ HOSP IP/OBS MODERATE 35: CPT | Performed by: INTERNAL MEDICINE

## 2022-10-22 RX ORDER — DIPHENHYDRAMINE HYDROCHLORIDE, ZINC ACETATE 2; .1 G/100G; G/100G
1 CREAM TOPICAL 3 TIMES DAILY PRN
Status: DISCONTINUED | OUTPATIENT
Start: 2022-10-22 | End: 2022-10-24

## 2022-10-22 NOTE — PLAN OF CARE
A&O x4. VSS overnight on RA. Mild abdominal pain, pt states improved since yesterday. IVF as ordered, see MAR for new orders. HA improving per pt statement. Toradol IV for pain with good relief. IV flagyl infusing as ordered. Up ad ratna. Voids freely. Reviewed POC, pain management, IS use, and fall precautions with pt. Pt reminded to use call light. Verbalized understanding. Will continue to monitor.

## 2022-10-22 NOTE — PLAN OF CARE
Alert, pleasant, and oriented x4. Left quadrant abdominal pain and tenderness decreased. Diet upgraded to clear liquid with no concerns. Up ad ratna with no assistive devices needed. Levaquin and Flagyl for antibiotics. Rash improved on right upper extremity. Cream ordered for right upper thigh rash. Plan is to discharge home when medically stable.

## 2022-10-23 LAB
ANION GAP SERPL CALC-SCNC: 6 MMOL/L (ref 0–18)
BASOPHILS # BLD AUTO: 0.03 X10(3) UL (ref 0–0.2)
BASOPHILS NFR BLD AUTO: 0.6 %
BUN BLD-MCNC: 5 MG/DL (ref 7–18)
CALCIUM BLD-MCNC: 8.8 MG/DL (ref 8.5–10.1)
CHLORIDE SERPL-SCNC: 109 MMOL/L (ref 98–112)
CO2 SERPL-SCNC: 25 MMOL/L (ref 21–32)
CREAT BLD-MCNC: 0.58 MG/DL
EOSINOPHIL # BLD AUTO: 0.24 X10(3) UL (ref 0–0.7)
EOSINOPHIL NFR BLD AUTO: 5.1 %
ERYTHROCYTE [DISTWIDTH] IN BLOOD BY AUTOMATED COUNT: 12.7 %
GFR SERPLBLD BASED ON 1.73 SQ M-ARVRAT: 107 ML/MIN/1.73M2 (ref 60–?)
GLUCOSE BLD-MCNC: 108 MG/DL (ref 70–99)
HCT VFR BLD AUTO: 37.2 %
HGB BLD-MCNC: 12.3 G/DL
IMM GRANULOCYTES # BLD AUTO: 0.02 X10(3) UL (ref 0–1)
IMM GRANULOCYTES NFR BLD: 0.4 %
LYMPHOCYTES # BLD AUTO: 1.34 X10(3) UL (ref 1–4)
LYMPHOCYTES NFR BLD AUTO: 28.3 %
MAGNESIUM SERPL-MCNC: 2.3 MG/DL (ref 1.6–2.6)
MCH RBC QN AUTO: 30.1 PG (ref 26–34)
MCHC RBC AUTO-ENTMCNC: 33.1 G/DL (ref 31–37)
MCV RBC AUTO: 91.2 FL
MONOCYTES # BLD AUTO: 0.72 X10(3) UL (ref 0.1–1)
MONOCYTES NFR BLD AUTO: 15.2 %
NEUTROPHILS # BLD AUTO: 2.38 X10 (3) UL (ref 1.5–7.7)
NEUTROPHILS # BLD AUTO: 2.38 X10(3) UL (ref 1.5–7.7)
NEUTROPHILS NFR BLD AUTO: 50.4 %
OSMOLALITY SERPL CALC.SUM OF ELEC: 288 MOSM/KG (ref 275–295)
PLATELET # BLD AUTO: 264 10(3)UL (ref 150–450)
POTASSIUM SERPL-SCNC: 3.5 MMOL/L (ref 3.5–5.1)
RBC # BLD AUTO: 4.08 X10(6)UL
SODIUM SERPL-SCNC: 140 MMOL/L (ref 136–145)
WBC # BLD AUTO: 4.7 X10(3) UL (ref 4–11)

## 2022-10-23 PROCEDURE — 99232 SBSQ HOSP IP/OBS MODERATE 35: CPT | Performed by: SURGERY

## 2022-10-23 PROCEDURE — 99232 SBSQ HOSP IP/OBS MODERATE 35: CPT | Performed by: INTERNAL MEDICINE

## 2022-10-23 RX ORDER — DIAPER,BRIEF,INFANT-TODD,DISP
EACH MISCELLANEOUS 2 TIMES DAILY
Status: DISCONTINUED | OUTPATIENT
Start: 2022-10-23 | End: 2022-10-24

## 2022-10-23 NOTE — PLAN OF CARE
Patient alert and oriented X4, Room air. Patients' diet progressed to low fiber, soft diet   Hydrocortisone creme is in patient bin for rash and has been applied to right thigh. Patient will continue IV antibiotics. Ambulated around the unit. Patient reported back pain. Tylenol and Norco was given for the pain management. Up ad ratna and voids independently. Reported nausea but was relieved by food and ambulation.

## 2022-10-24 VITALS
RESPIRATION RATE: 18 BRPM | WEIGHT: 190 LBS | HEIGHT: 66 IN | TEMPERATURE: 99 F | HEART RATE: 73 BPM | DIASTOLIC BLOOD PRESSURE: 80 MMHG | OXYGEN SATURATION: 95 % | BODY MASS INDEX: 30.53 KG/M2 | SYSTOLIC BLOOD PRESSURE: 122 MMHG

## 2022-10-24 PROCEDURE — 99239 HOSP IP/OBS DSCHRG MGMT >30: CPT | Performed by: INTERNAL MEDICINE

## 2022-10-24 RX ORDER — LEVOFLOXACIN 750 MG/1
750 TABLET ORAL DAILY
Qty: 10 TABLET | Refills: 0 | Status: SHIPPED | OUTPATIENT
Start: 2022-10-24 | End: 2022-11-03

## 2022-10-24 RX ORDER — METRONIDAZOLE 500 MG/1
500 TABLET ORAL 3 TIMES DAILY
Qty: 30 TABLET | Refills: 0 | Status: SHIPPED | OUTPATIENT
Start: 2022-10-24 | End: 2022-11-03

## 2022-10-24 NOTE — PLAN OF CARE
A&Ox4. VSS on room air. Denies any pain. Voiding with no difficulty. Reported feeling nauseous and was given ordered Zofran PRN. Has a rash to her upper right thigh and right arm. Refused hydrocortisone  cream tonight, and said she wants it in the morning. Plan to dc tomorrow and follow dr Destiny Watson outpatient. Discussed plan of care with patient. Safety precautions in place.

## 2022-10-24 NOTE — PLAN OF CARE
Pt a/ox4. Reports low back pain, improved with application of heat. Up ad ratna in room. Tolerating soft diet, denies any increase in nausea or abdominal pain. Continuing IV levaquin and flagyl. Plan to home today if cleared by all MDs.

## 2022-10-24 NOTE — PROGRESS NOTES
Patient discharged to home this afternoon. Reviewed all discharge instructions at bedside with patient. All questions answered. Patient verbalizes understanding of all teaching at this time. Prescriptions electronically sent to patient home pharmacy. Escorted via wheelchair to [facility] and released to the care of her family.

## 2022-10-24 NOTE — PROGRESS NOTES
Patient tolerating diet. Plan for discharge home today on abx. She will f/u with PCP and general surgery outpatient.     Giselle Adams DO

## 2022-10-25 ENCOUNTER — PATIENT OUTREACH (OUTPATIENT)
Dept: CASE MANAGEMENT | Age: 57
End: 2022-10-25

## 2022-10-25 ENCOUNTER — PATIENT MESSAGE (OUTPATIENT)
Dept: FAMILY MEDICINE CLINIC | Facility: CLINIC | Age: 57
End: 2022-10-25

## 2022-10-25 NOTE — PROGRESS NOTES
ABIOLABENJAMÍN for post hospital follow up. [facility] contact information provided as well as [facility] office number, [phone].

## 2022-10-25 NOTE — PROGRESS NOTES
Patient returned TCM call, we spoke about TCC.  Patient had already placed a call with PCP for follow up appointment, so we assisted in scheduling directly with Dr. Liat Triana per patient request.     Future Appointments   Date Time Provider Raudel Virgen   10/28/2022  1:00 PM Carole Rush MD [facility] [address]   11/8/2022  4:00 PM Kwadwo Car MD [facility]

## 2022-10-25 NOTE — PROGRESS NOTES
ABIOLABENJAMÍN for post hospital follow up. [facility] contact information provided [facility], [phone].

## 2022-10-25 NOTE — TELEPHONE ENCOUNTER
From: Rebecca Major  To: Thanh Boss MD  Sent: 10/25/2022 10:05 AM CDT  Subject: Follow Up    Good Morning. I just got out of the hospital after a diverticulitis episode and was told to follow up with Dr Reina Carpio.  Do I need to come in?

## 2022-10-28 ENCOUNTER — OFFICE VISIT (OUTPATIENT)
Dept: FAMILY MEDICINE CLINIC | Facility: CLINIC | Age: 57
End: 2022-10-28
Payer: COMMERCIAL

## 2022-10-28 VITALS
BODY MASS INDEX: 32.95 KG/M2 | SYSTOLIC BLOOD PRESSURE: 120 MMHG | DIASTOLIC BLOOD PRESSURE: 72 MMHG | TEMPERATURE: 98 F | OXYGEN SATURATION: 99 % | HEIGHT: 66 IN | HEART RATE: 70 BPM | RESPIRATION RATE: 16 BRPM | WEIGHT: 205 LBS

## 2022-10-28 DIAGNOSIS — R11.0 NAUSEA: ICD-10-CM

## 2022-10-28 DIAGNOSIS — Z23 NEED FOR VACCINATION: ICD-10-CM

## 2022-10-28 DIAGNOSIS — K57.92 ACUTE DIVERTICULITIS: Primary | ICD-10-CM

## 2022-10-28 PROCEDURE — 90471 IMMUNIZATION ADMIN: CPT | Performed by: FAMILY MEDICINE

## 2022-10-28 PROCEDURE — 3078F DIAST BP <80 MM HG: CPT | Performed by: FAMILY MEDICINE

## 2022-10-28 PROCEDURE — 99495 TRANSJ CARE MGMT MOD F2F 14D: CPT | Performed by: FAMILY MEDICINE

## 2022-10-28 PROCEDURE — 3008F BODY MASS INDEX DOCD: CPT | Performed by: FAMILY MEDICINE

## 2022-10-28 PROCEDURE — 90686 IIV4 VACC NO PRSV 0.5 ML IM: CPT | Performed by: FAMILY MEDICINE

## 2022-10-28 PROCEDURE — 3074F SYST BP LT 130 MM HG: CPT | Performed by: FAMILY MEDICINE

## 2022-10-28 RX ORDER — ONDANSETRON 4 MG/1
4 TABLET, ORALLY DISINTEGRATING ORAL EVERY 8 HOURS PRN
Qty: 30 TABLET | Refills: 0 | Status: SHIPPED | OUTPATIENT
Start: 2022-10-28

## 2022-11-08 ENCOUNTER — OFFICE VISIT (OUTPATIENT)
Facility: LOCATION | Age: 57
End: 2022-11-08
Payer: COMMERCIAL

## 2022-11-08 VITALS — HEART RATE: 68 BPM | TEMPERATURE: 98 F

## 2022-11-08 DIAGNOSIS — K57.92 ACUTE DIVERTICULITIS: Primary | ICD-10-CM

## 2022-11-08 RX ORDER — CIPROFLOXACIN 750 MG/1
TABLET, FILM COATED ORAL
Qty: 2 TABLET | Refills: 0 | Status: SHIPPED | OUTPATIENT
Start: 2022-11-08

## 2022-11-08 RX ORDER — METRONIDAZOLE 250 MG/1
TABLET ORAL
Qty: 3 TABLET | Refills: 0 | Status: SHIPPED | OUTPATIENT
Start: 2022-11-08

## 2022-11-08 NOTE — PATIENT INSTRUCTIONS
The patient presents for evaluation following a hospitalization from October 21, 2022 until October 24, 2022 for an acute diverticulitis attack. The patient states she has had 5 diverticulitis attacks in the last 5 years. She states she had 2 major attacks, requiring hospitalization. The second of these major attacks was her attack in October. She states she had several days of worsening abdominal bloating, left lower quadrant abdominal pain and nausea. Her significant pain was associated with decreased appetite. As her symptoms continue to worsen, she ultimately presented to the emergency department and was hospitalized on IV antibiotics. She states while hospitalized, she had 3 instances of blood in her stool. She denies diarrhea or constipation. She denies melena or mucus in her stools. CT scan of her abdomen and pelvis from October 21, 2022 during her hospitalization reveals significant diverticulitis of the descending colon. The inflammation of her colon and thickening of her colon wall extends up near the splenic flexure. There is a developing phlegmon/possible abscess in the distal descending colon. There is no evidence of free air or fluid. All other significant findings can be seen in the radiologist report. The patient states while she was hospitalized, she was on a clear liquid diet and slowly advanced. She is ultimately discharged home on October 24, 2022 on Levaquin and Flagyl. The patient states she completed her course of antibiotics 5 days ago. At today's visit, the patient states she is no longer having any symptoms. She states she had 1 \"twinge\" of left lower quadrant abdominal pain 2 days ago, but this resolved. She denies current fevers or chills. She is tolerating a bland diet. She denies nausea or vomiting. The patient has no significant past medical history. She does not take any blood thinners. The patient had a laparoscopic cholecystectomy in 1993.   She also had an umbilical herniorrhaphy with mesh in 1998. The patient's last colonoscopy was on July 7, 2017 with Dr. Veronica Zuniga. She had diverticulosis at that time. She had this colonoscopy following her first major diverticulitis attack. The patient denies any first or second-degree relatives with a history of colon cancer/colon polyps. The patient denies any first or second-degree relatives with history of uterine cancer. Clinical examination of the abdomen reveals it to be soft, nondistended, nontender, bowel sounds are normal activity normal pitch. There  is no rebounding tenderness or guarding. There are no signs of ascites or peritonitis. The liver and spleen are nonpalpable. There are no palpable masses. There are no umbilical or inguinal hernias. The patient has a well-healed midline incision. I discussed with the patient that I do recommend a robotic low anterior resection of the rectosigmoid colon for other diverticulitis to remove the section of the diseased colon. I discussed with the patient that this procedure does carry a 3 to 5% risk of an anastomotic leak. If this were to occur, the patient would then require an emergent laparotomy with likely creation of colostomy. It was discussed that at this time however, the risk of an anastomotic leak is less than there lifetime risk of a perforation secondary there diverticulitis. We discussed other risks of surgery including bleeding and infection. I recommend the patient refrain from advancing her diet, until her symptoms are entirely resolved and she is feeling 100% healthy. At that time she may increase the fiber in her diet. I instructed the patient to call our office with any new or worsening symptoms. She should call with any signs of an acute diverticulitis attack. She is currently scheduled for a low anterior resection of the rectosigmoid colon on December 19, 2022.   If she has a diverticulitis attack prior to this, we will prescribe her antibiotics. All risks, benefits, complications and alternatives to the proposed procedure(s) were fully discussed with the patient. All questions from the patient were answered in detail. A description of the procedure(s) possible outcomes was fully discussed. The patient seemed to understand the conversation and its details. Consent for the procedure(s) was confirmed with the patient. During this visit, the Enhanced Recovery after Intestinal Surgery (ERAS) Patient Guide was discussed with Wan Nava. She was provided a copy of the guide to review at home, which includes education on the strategy, pre-op, intra-op and what to expect during the hospital stay for elective colorectal cases.

## 2022-11-09 RX ORDER — POLYETHYLENE GLYCOL 3350, SODIUM CHLORIDE, SODIUM BICARBONATE, POTASSIUM CHLORIDE 420; 11.2; 5.72; 1.48 G/4L; G/4L; G/4L; G/4L
POWDER, FOR SOLUTION ORAL
Qty: 1 EACH | Refills: 0 | Status: SHIPPED | OUTPATIENT
Start: 2022-11-09

## 2022-11-14 ENCOUNTER — OFFICE VISIT (OUTPATIENT)
Facility: LOCATION | Age: 57
End: 2022-11-14
Payer: COMMERCIAL

## 2022-11-14 VITALS — HEART RATE: 59 BPM | TEMPERATURE: 98 F

## 2022-11-14 DIAGNOSIS — K57.92 ACUTE DIVERTICULITIS: Primary | ICD-10-CM

## 2022-11-14 NOTE — PATIENT INSTRUCTIONS
The patient presents for a 1 week follow-up regarding her acute diverticulitis. She is currently scheduled for a robotic low anterior resection of the rectosigmoid colon on December 19, 2022. The patient states she has been doing well over the past week. She states she has some anxiety regarding her upcoming operation. She had 1 instance of slight pain in the left lower quadrant over the last week. She is not currently on antibiotics. She is currently tolerating a low fiber, bland diet. She denies nausea or vomiting. She states she is having regular bowel movements. She denies diarrhea or constipation. She denies fevers or chills. Clinical examination of the abdomen reveals it to be soft, nondistended, nontender, bowel sounds are normal activity normal pitch. There  is no rebounding tenderness or guarding. There are no signs of ascites or peritonitis. The liver and spleen are nonpalpable. There are no palpable masses. There are no umbilical or inguinal hernias. I instructed the patient to call our office with any new or worsening symptoms. If she begins to have lower abdominal pain, she should call our office, so we can provide her with antibiotics. The patient expressed understanding to the above plan. She will see Dr. Yaritza Floyd in 1 month. We will proceed with her robotic low anterior resection of the rectosigmoid colon on December 19, 2022.

## 2022-11-29 ENCOUNTER — PATIENT MESSAGE (OUTPATIENT)
Facility: LOCATION | Age: 57
End: 2022-11-29

## 2022-11-29 DIAGNOSIS — K57.92 ACUTE DIVERTICULITIS: Primary | ICD-10-CM

## 2022-11-30 RX ORDER — METRONIDAZOLE 250 MG/1
250 TABLET ORAL 3 TIMES DAILY
Qty: 42 TABLET | Refills: 0 | Status: SHIPPED | OUTPATIENT
Start: 2022-11-30 | End: 2022-12-14

## 2022-11-30 RX ORDER — LEVOFLOXACIN 500 MG/1
500 TABLET, FILM COATED ORAL DAILY
Qty: 14 TABLET | Refills: 0 | Status: SHIPPED | OUTPATIENT
Start: 2022-11-30 | End: 2022-12-14

## 2022-12-05 ENCOUNTER — TELEPHONE (OUTPATIENT)
Facility: LOCATION | Age: 57
End: 2022-12-05

## 2022-12-05 DIAGNOSIS — K57.92 ACUTE DIVERTICULITIS: Primary | ICD-10-CM

## 2022-12-05 NOTE — TELEPHONE ENCOUNTER
Order placed for robotic low anterior resection of the rectosigmoid colon    Submitted request to [facility] for robotic low anterior resection of the rectosigmoid colon CPT 35675 to be done Inpatient at [facility] (3-5 days)    Status: pending [facility] clinical review

## 2022-12-12 NOTE — TELEPHONE ENCOUNTER
robotic low anterior resection of the rectosigmoid colon Approved by [facility] valid 12/5/22-2/28/2023

## 2022-12-12 NOTE — TELEPHONE ENCOUNTER
robotic low anterior resection of the rectosigmoid colon still pending  Message sent to Adia BISHOP at [facility] to check status

## 2022-12-13 ENCOUNTER — OFFICE VISIT (OUTPATIENT)
Facility: LOCATION | Age: 57
End: 2022-12-13
Payer: COMMERCIAL

## 2022-12-13 VITALS — HEART RATE: 68 BPM | TEMPERATURE: 98 F

## 2022-12-13 DIAGNOSIS — K57.92 ACUTE DIVERTICULITIS: Primary | ICD-10-CM

## 2022-12-13 PROCEDURE — 99215 OFFICE O/P EST HI 40 MIN: CPT | Performed by: COLON & RECTAL SURGERY

## 2022-12-13 RX ORDER — METRONIDAZOLE 250 MG/1
TABLET ORAL
Qty: 30 TABLET | Refills: 0 | Status: ON HOLD | OUTPATIENT
Start: 2022-12-13 | End: 2022-12-19 | Stop reason: CLARIF

## 2022-12-13 RX ORDER — LEVOFLOXACIN 500 MG/1
TABLET, FILM COATED ORAL
Qty: 10 TABLET | Refills: 0 | Status: ON HOLD | OUTPATIENT
Start: 2022-12-13 | End: 2022-12-19 | Stop reason: CLARIF

## 2022-12-13 NOTE — PATIENT INSTRUCTIONS
This patient presents for further care and treatment regarding her episodes of acute diverticulitis. She has now had a fairly continuous attack of diverticulitis for the last several weeks. Her presenting history as listed below:    The patient states she has had 5 diverticulitis attacks in the last 5 years. She states she had 2 major attacks, requiring hospitalization. The second of these major attacks was her attack in October. She states she had several days of worsening abdominal bloating, left lower quadrant abdominal pain and nausea. Her significant pain was associated with decreased appetite. As her symptoms continue to worsen, she ultimately presented to the emergency department and was hospitalized on IV antibiotics. She states while hospitalized, she had 3 instances of blood in her stool. She denies diarrhea or constipation. She denies melena or mucus in her stools. CT scan of her abdomen and pelvis from October 21, 2022 during her hospitalization reveals significant diverticulitis of the descending colon. The inflammation of her colon and thickening of her colon wall extends up near the splenic flexure. There is a developing phlegmon/possible abscess in the distal descending colon. There is no evidence of free air or fluid. All other significant findings can be seen in the radiologist report. The patient states while she was hospitalized, she was on a clear liquid diet and slowly advanced. She is ultimately discharged home on October 24, 2022 on Levaquin and Flagyl. She has been on and off Levaquin and Flagyl since that discharge. Includes up to today's visit. She remains with some tenderness in the left lower quadrant. She has no fever or chills. She has no nausea or vomiting. She has no diarrhea or constipation. She is tolerating a general diet. Clinical exam today reveals her abdomen to be soft, nondistended, tender in the left lower quadrant to moderate palpation.   She has slight guarding, no rebound. No masses. Body weight is 205 pounds. Liver and spleen are not palpable. There are no inguinal or umbilical hernias present. There is a midline incision that starts in the epigastrium and extends just below the umbilicus and measures approximately 5 inches in length. There is a ventral hernia repair with mesh at the site. There is no evidence of recurrence of the hernia. Her point of maximal tenderness is near the anterior superior iliac spine at the edge of the rectus sheath. There are her no palpable masses at that site or fullness. This patient has an ongoing attack of diverticulitis. She will be on antibiotics up until the day of operation. She should actually stop the antibiotics the day prior to surgery as she will be doing a bowel prep on that date that will include antibiotics. The patient should present to us urgently for any advancing symptoms prior to her scheduled date of surgery. She is currently scheduled for December 19, 2022. This patient will undergo a robotic low anterior resection of the rectosigmoid colon. I had a long conversation with the patient again today regarding possible outcomes, possible colostomy, possible anastomotic leak, possible return to the operating room, and the risk of bleeding enough to go back to surgery, bleeding enough to require transfusion. My next visit with this patient will be on the day of her operation. All risks, benefits, complications and alternatives to the proposed procedure(s) were fully discussed with the patient. All questions from the patient were answered in detail. A description of the procedure(s) and possible outcomes was fully discussed. The patient seemed to understand the conversation and its details. Consent for the procedure(s) was confirmed with the patient.

## 2022-12-14 RX ORDER — ZINC SULFATE 50(220)MG
220 CAPSULE ORAL DAILY
COMMUNITY

## 2022-12-14 RX ORDER — MELATONIN
1000 DAILY
COMMUNITY

## 2022-12-16 ENCOUNTER — LABORATORY ENCOUNTER (OUTPATIENT)
Dept: LAB | Age: 57
End: 2022-12-16
Attending: COLON & RECTAL SURGERY
Payer: COMMERCIAL

## 2022-12-16 ENCOUNTER — LAB ENCOUNTER (OUTPATIENT)
Dept: LAB | Age: 57
End: 2022-12-16
Attending: COLON & RECTAL SURGERY
Payer: COMMERCIAL

## 2022-12-16 DIAGNOSIS — Z01.812 ENCOUNTER FOR PREPROCEDURE SCREENING LABORATORY TESTING FOR COVID-19: ICD-10-CM

## 2022-12-16 DIAGNOSIS — K57.92 ACUTE DIVERTICULITIS: ICD-10-CM

## 2022-12-16 DIAGNOSIS — Z20.822 ENCOUNTER FOR PREPROCEDURE SCREENING LABORATORY TESTING FOR COVID-19: ICD-10-CM

## 2022-12-16 LAB
ERYTHROCYTE [DISTWIDTH] IN BLOOD BY AUTOMATED COUNT: 13.4 %
HCT VFR BLD AUTO: 40.6 %
HGB BLD-MCNC: 13.4 G/DL
MCH RBC QN AUTO: 29.5 PG (ref 26–34)
MCHC RBC AUTO-ENTMCNC: 33 G/DL (ref 31–37)
MCV RBC AUTO: 89.4 FL
PLATELET # BLD AUTO: 334 10(3)UL (ref 150–450)
RBC # BLD AUTO: 4.54 X10(6)UL
WBC # BLD AUTO: 5.3 X10(3) UL (ref 4–11)

## 2022-12-16 PROCEDURE — 36415 COLL VENOUS BLD VENIPUNCTURE: CPT

## 2022-12-16 PROCEDURE — 85027 COMPLETE CBC AUTOMATED: CPT

## 2022-12-17 LAB — SARS-COV-2 RNA RESP QL NAA+PROBE: NOT DETECTED

## 2022-12-19 ENCOUNTER — HOSPITAL ENCOUNTER (INPATIENT)
Facility: HOSPITAL | Age: 57
LOS: 2 days | Discharge: HOME | End: 2022-12-21
Attending: COLON & RECTAL SURGERY | Admitting: COLON & RECTAL SURGERY
Payer: COMMERCIAL

## 2022-12-19 ENCOUNTER — ANESTHESIA (OUTPATIENT)
Dept: SURGERY | Facility: HOSPITAL | Age: 57
End: 2022-12-19
Payer: COMMERCIAL

## 2022-12-19 ENCOUNTER — ANESTHESIA EVENT (OUTPATIENT)
Dept: SURGERY | Facility: HOSPITAL | Age: 57
End: 2022-12-19
Payer: COMMERCIAL

## 2022-12-19 ENCOUNTER — HOSPITAL ENCOUNTER (INPATIENT)
Facility: HOSPITAL | Age: 57
LOS: 2 days | Discharge: HOME | DRG: 331 | End: 2022-12-21
Attending: COLON & RECTAL SURGERY | Admitting: COLON & RECTAL SURGERY
Payer: COMMERCIAL

## 2022-12-19 DIAGNOSIS — K57.92 ACUTE DIVERTICULITIS: Primary | ICD-10-CM

## 2022-12-19 DIAGNOSIS — Z01.812 ENCOUNTER FOR PREPROCEDURE SCREENING LABORATORY TESTING FOR COVID-19: ICD-10-CM

## 2022-12-19 DIAGNOSIS — Z20.822 ENCOUNTER FOR PREPROCEDURE SCREENING LABORATORY TESTING FOR COVID-19: ICD-10-CM

## 2022-12-19 PROCEDURE — 4A1BXSH MONITORING OF GASTROINTESTINAL VASCULAR PERFUSION USING INDOCYANINE GREEN DYE, EXTERNAL APPROACH: ICD-10-PCS | Performed by: COLON & RECTAL SURGERY

## 2022-12-19 PROCEDURE — 0DTN0ZZ RESECTION OF SIGMOID COLON, OPEN APPROACH: ICD-10-PCS | Performed by: COLON & RECTAL SURGERY

## 2022-12-19 PROCEDURE — 8E0W4CZ ROBOTIC ASSISTED PROCEDURE OF TRUNK REGION, PERCUTANEOUS ENDOSCOPIC APPROACH: ICD-10-PCS | Performed by: COLON & RECTAL SURGERY

## 2022-12-19 RX ORDER — ONDANSETRON 2 MG/ML
4 INJECTION INTRAMUSCULAR; INTRAVENOUS EVERY 6 HOURS PRN
Status: DISCONTINUED | OUTPATIENT
Start: 2022-12-19 | End: 2022-12-19 | Stop reason: HOSPADM

## 2022-12-19 RX ORDER — SODIUM CHLORIDE, SODIUM LACTATE, POTASSIUM CHLORIDE, CALCIUM CHLORIDE 600; 310; 30; 20 MG/100ML; MG/100ML; MG/100ML; MG/100ML
INJECTION, SOLUTION INTRAVENOUS CONTINUOUS
Status: DISCONTINUED | OUTPATIENT
Start: 2022-12-19 | End: 2022-12-19 | Stop reason: HOSPADM

## 2022-12-19 RX ORDER — FAMOTIDINE 20 MG/1
20 TABLET, FILM COATED ORAL 2 TIMES DAILY
Status: DISCONTINUED | OUTPATIENT
Start: 2022-12-19 | End: 2022-12-21

## 2022-12-19 RX ORDER — HYDROCODONE BITARTRATE AND ACETAMINOPHEN 5; 325 MG/1; MG/1
1 TABLET ORAL EVERY 6 HOURS PRN
Status: DISCONTINUED | OUTPATIENT
Start: 2022-12-19 | End: 2022-12-21

## 2022-12-19 RX ORDER — KETOROLAC TROMETHAMINE 30 MG/ML
30 INJECTION, SOLUTION INTRAMUSCULAR; INTRAVENOUS EVERY 6 HOURS PRN
Status: DISPENSED | OUTPATIENT
Start: 2022-12-19 | End: 2022-12-21

## 2022-12-19 RX ORDER — HYDROMORPHONE HYDROCHLORIDE 1 MG/ML
0.2 INJECTION, SOLUTION INTRAMUSCULAR; INTRAVENOUS; SUBCUTANEOUS EVERY 5 MIN PRN
Status: DISCONTINUED | OUTPATIENT
Start: 2022-12-19 | End: 2022-12-19 | Stop reason: HOSPADM

## 2022-12-19 RX ORDER — MEPERIDINE HYDROCHLORIDE 25 MG/ML
12.5 INJECTION INTRAMUSCULAR; INTRAVENOUS; SUBCUTANEOUS AS NEEDED
Status: DISCONTINUED | OUTPATIENT
Start: 2022-12-19 | End: 2022-12-19 | Stop reason: HOSPADM

## 2022-12-19 RX ORDER — HEPARIN SODIUM 5000 [USP'U]/ML
5000 INJECTION, SOLUTION INTRAVENOUS; SUBCUTANEOUS EVERY 8 HOURS SCHEDULED
Status: DISCONTINUED | OUTPATIENT
Start: 2022-12-20 | End: 2022-12-21

## 2022-12-19 RX ORDER — HEPARIN SODIUM 5000 [USP'U]/ML
5000 INJECTION, SOLUTION INTRAVENOUS; SUBCUTANEOUS ONCE
Status: COMPLETED | OUTPATIENT
Start: 2022-12-19 | End: 2022-12-19

## 2022-12-19 RX ORDER — CLINDAMYCIN PHOSPHATE 900 MG/50ML
900 INJECTION INTRAVENOUS EVERY 8 HOURS
Status: COMPLETED | OUTPATIENT
Start: 2022-12-19 | End: 2022-12-20

## 2022-12-19 RX ORDER — PROCHLORPERAZINE EDISYLATE 5 MG/ML
5 INJECTION INTRAMUSCULAR; INTRAVENOUS EVERY 8 HOURS PRN
Status: DISCONTINUED | OUTPATIENT
Start: 2022-12-19 | End: 2022-12-19 | Stop reason: HOSPADM

## 2022-12-19 RX ORDER — LIDOCAINE HYDROCHLORIDE 10 MG/ML
INJECTION, SOLUTION EPIDURAL; INFILTRATION; INTRACAUDAL; PERINEURAL AS NEEDED
Status: DISCONTINUED | OUTPATIENT
Start: 2022-12-19 | End: 2022-12-19 | Stop reason: SURG

## 2022-12-19 RX ORDER — GLYCOPYRROLATE 0.2 MG/ML
INJECTION, SOLUTION INTRAMUSCULAR; INTRAVENOUS AS NEEDED
Status: DISCONTINUED | OUTPATIENT
Start: 2022-12-19 | End: 2022-12-19 | Stop reason: SURG

## 2022-12-19 RX ORDER — NEOSTIGMINE METHYLSULFATE 1 MG/ML
INJECTION, SOLUTION INTRAVENOUS AS NEEDED
Status: DISCONTINUED | OUTPATIENT
Start: 2022-12-19 | End: 2022-12-19 | Stop reason: SURG

## 2022-12-19 RX ORDER — DEXTROSE AND SODIUM CHLORIDE 5; .45 G/100ML; G/100ML
INJECTION, SOLUTION INTRAVENOUS CONTINUOUS
Status: DISCONTINUED | OUTPATIENT
Start: 2022-12-19 | End: 2022-12-21

## 2022-12-19 RX ORDER — PROCHLORPERAZINE EDISYLATE 5 MG/ML
5 INJECTION INTRAMUSCULAR; INTRAVENOUS EVERY 8 HOURS PRN
Status: DISCONTINUED | OUTPATIENT
Start: 2022-12-19 | End: 2022-12-21

## 2022-12-19 RX ORDER — HYDROMORPHONE HYDROCHLORIDE 1 MG/ML
0.8 INJECTION, SOLUTION INTRAMUSCULAR; INTRAVENOUS; SUBCUTANEOUS EVERY 2 HOUR PRN
Status: DISCONTINUED | OUTPATIENT
Start: 2022-12-19 | End: 2022-12-21

## 2022-12-19 RX ORDER — DIPHENHYDRAMINE HYDROCHLORIDE 50 MG/ML
12.5 INJECTION INTRAMUSCULAR; INTRAVENOUS AS NEEDED
Status: DISCONTINUED | OUTPATIENT
Start: 2022-12-19 | End: 2022-12-19 | Stop reason: HOSPADM

## 2022-12-19 RX ORDER — ONDANSETRON 2 MG/ML
INJECTION INTRAMUSCULAR; INTRAVENOUS AS NEEDED
Status: DISCONTINUED | OUTPATIENT
Start: 2022-12-19 | End: 2022-12-19 | Stop reason: SURG

## 2022-12-19 RX ORDER — PHENYLEPHRINE HCL 10 MG/ML
VIAL (ML) INJECTION AS NEEDED
Status: DISCONTINUED | OUTPATIENT
Start: 2022-12-19 | End: 2022-12-19 | Stop reason: SURG

## 2022-12-19 RX ORDER — INDOCYANINE GREEN AND WATER 25 MG
KIT INJECTION AS NEEDED
Status: DISCONTINUED | OUTPATIENT
Start: 2022-12-19 | End: 2022-12-19 | Stop reason: SURG

## 2022-12-19 RX ORDER — MIDAZOLAM HYDROCHLORIDE 1 MG/ML
INJECTION INTRAMUSCULAR; INTRAVENOUS AS NEEDED
Status: DISCONTINUED | OUTPATIENT
Start: 2022-12-19 | End: 2022-12-19 | Stop reason: SURG

## 2022-12-19 RX ORDER — FAMOTIDINE 10 MG/ML
20 INJECTION, SOLUTION INTRAVENOUS 2 TIMES DAILY
Status: DISCONTINUED | OUTPATIENT
Start: 2022-12-19 | End: 2022-12-21

## 2022-12-19 RX ORDER — ACETAMINOPHEN 500 MG
500 TABLET ORAL EVERY 6 HOURS PRN
Status: DISCONTINUED | OUTPATIENT
Start: 2022-12-19 | End: 2022-12-21

## 2022-12-19 RX ORDER — ROCURONIUM BROMIDE 10 MG/ML
INJECTION, SOLUTION INTRAVENOUS AS NEEDED
Status: DISCONTINUED | OUTPATIENT
Start: 2022-12-19 | End: 2022-12-19 | Stop reason: SURG

## 2022-12-19 RX ORDER — ONDANSETRON 2 MG/ML
4 INJECTION INTRAMUSCULAR; INTRAVENOUS EVERY 6 HOURS PRN
Status: DISCONTINUED | OUTPATIENT
Start: 2022-12-19 | End: 2022-12-21

## 2022-12-19 RX ORDER — HYDROMORPHONE HYDROCHLORIDE 1 MG/ML
0.6 INJECTION, SOLUTION INTRAMUSCULAR; INTRAVENOUS; SUBCUTANEOUS EVERY 5 MIN PRN
Status: DISCONTINUED | OUTPATIENT
Start: 2022-12-19 | End: 2022-12-19 | Stop reason: HOSPADM

## 2022-12-19 RX ORDER — HYDROMORPHONE HYDROCHLORIDE 1 MG/ML
0.2 INJECTION, SOLUTION INTRAMUSCULAR; INTRAVENOUS; SUBCUTANEOUS EVERY 2 HOUR PRN
Status: DISCONTINUED | OUTPATIENT
Start: 2022-12-19 | End: 2022-12-21

## 2022-12-19 RX ORDER — MIDAZOLAM HYDROCHLORIDE 1 MG/ML
1 INJECTION INTRAMUSCULAR; INTRAVENOUS EVERY 5 MIN PRN
Status: DISCONTINUED | OUTPATIENT
Start: 2022-12-19 | End: 2022-12-19 | Stop reason: HOSPADM

## 2022-12-19 RX ORDER — KETOROLAC TROMETHAMINE 30 MG/ML
INJECTION, SOLUTION INTRAMUSCULAR; INTRAVENOUS AS NEEDED
Status: DISCONTINUED | OUTPATIENT
Start: 2022-12-19 | End: 2022-12-19 | Stop reason: SURG

## 2022-12-19 RX ORDER — EPHEDRINE SULFATE 50 MG/ML
INJECTION INTRAVENOUS AS NEEDED
Status: DISCONTINUED | OUTPATIENT
Start: 2022-12-19 | End: 2022-12-19 | Stop reason: SURG

## 2022-12-19 RX ORDER — DEXAMETHASONE SODIUM PHOSPHATE 4 MG/ML
VIAL (ML) INJECTION AS NEEDED
Status: DISCONTINUED | OUTPATIENT
Start: 2022-12-19 | End: 2022-12-19 | Stop reason: SURG

## 2022-12-19 RX ORDER — HYDROMORPHONE HYDROCHLORIDE 1 MG/ML
0.4 INJECTION, SOLUTION INTRAMUSCULAR; INTRAVENOUS; SUBCUTANEOUS EVERY 5 MIN PRN
Status: DISCONTINUED | OUTPATIENT
Start: 2022-12-19 | End: 2022-12-19 | Stop reason: HOSPADM

## 2022-12-19 RX ORDER — ACETAMINOPHEN 500 MG
1000 TABLET ORAL ONCE
Status: DISCONTINUED | OUTPATIENT
Start: 2022-12-19 | End: 2022-12-19 | Stop reason: HOSPADM

## 2022-12-19 RX ORDER — NALOXONE HYDROCHLORIDE 0.4 MG/ML
80 INJECTION, SOLUTION INTRAMUSCULAR; INTRAVENOUS; SUBCUTANEOUS AS NEEDED
Status: DISCONTINUED | OUTPATIENT
Start: 2022-12-19 | End: 2022-12-19 | Stop reason: HOSPADM

## 2022-12-19 RX ORDER — CLINDAMYCIN PHOSPHATE 900 MG/50ML
900 INJECTION INTRAVENOUS ONCE
Status: COMPLETED | OUTPATIENT
Start: 2022-12-19 | End: 2022-12-19

## 2022-12-19 RX ORDER — HYDROMORPHONE HYDROCHLORIDE 1 MG/ML
0.4 INJECTION, SOLUTION INTRAMUSCULAR; INTRAVENOUS; SUBCUTANEOUS EVERY 2 HOUR PRN
Status: DISCONTINUED | OUTPATIENT
Start: 2022-12-19 | End: 2022-12-21

## 2022-12-19 RX ORDER — SODIUM CHLORIDE, SODIUM LACTATE, POTASSIUM CHLORIDE, CALCIUM CHLORIDE 600; 310; 30; 20 MG/100ML; MG/100ML; MG/100ML; MG/100ML
INJECTION, SOLUTION INTRAVENOUS CONTINUOUS
Status: DISCONTINUED | OUTPATIENT
Start: 2022-12-19 | End: 2022-12-21

## 2022-12-19 RX ORDER — BUPIVACAINE HYDROCHLORIDE AND EPINEPHRINE 5; 5 MG/ML; UG/ML
INJECTION, SOLUTION EPIDURAL; INTRACAUDAL; PERINEURAL AS NEEDED
Status: DISCONTINUED | OUTPATIENT
Start: 2022-12-19 | End: 2022-12-19 | Stop reason: HOSPADM

## 2022-12-19 RX ORDER — LIDOCAINE HYDROCHLORIDE ANHYDROUS AND DEXTROSE MONOHYDRATE .8; 5 G/100ML; G/100ML
INJECTION, SOLUTION INTRAVENOUS CONTINUOUS PRN
Status: DISCONTINUED | OUTPATIENT
Start: 2022-12-19 | End: 2022-12-19 | Stop reason: SURG

## 2022-12-19 RX ORDER — ACETAMINOPHEN 500 MG
1000 TABLET ORAL EVERY 6 HOURS PRN
Status: DISCONTINUED | OUTPATIENT
Start: 2022-12-19 | End: 2022-12-21

## 2022-12-19 RX ADMIN — ONDANSETRON 4 MG: 2 INJECTION INTRAMUSCULAR; INTRAVENOUS at 11:02:00

## 2022-12-19 RX ADMIN — MIDAZOLAM HYDROCHLORIDE 2 MG: 1 INJECTION INTRAMUSCULAR; INTRAVENOUS at 07:33:00

## 2022-12-19 RX ADMIN — ROCURONIUM BROMIDE 10 MG: 10 INJECTION, SOLUTION INTRAVENOUS at 10:24:00

## 2022-12-19 RX ADMIN — SODIUM CHLORIDE, SODIUM LACTATE, POTASSIUM CHLORIDE, CALCIUM CHLORIDE: 600; 310; 30; 20 INJECTION, SOLUTION INTRAVENOUS at 07:33:00

## 2022-12-19 RX ADMIN — NEOSTIGMINE METHYLSULFATE 5 MG: 1 INJECTION, SOLUTION INTRAVENOUS at 11:02:00

## 2022-12-19 RX ADMIN — KETOROLAC TROMETHAMINE 30 MG: 30 INJECTION, SOLUTION INTRAMUSCULAR; INTRAVENOUS at 10:53:00

## 2022-12-19 RX ADMIN — INDOCYANINE GREEN AND WATER 7.5 MG: 25 MG KIT INJECTION at 10:24:00

## 2022-12-19 RX ADMIN — EPHEDRINE SULFATE 5 MG: 50 INJECTION INTRAVENOUS at 08:11:00

## 2022-12-19 RX ADMIN — PHENYLEPHRINE HCL 100 MCG: 10 MG/ML VIAL (ML) INJECTION at 08:09:00

## 2022-12-19 RX ADMIN — EPHEDRINE SULFATE 5 MG: 50 INJECTION INTRAVENOUS at 08:09:00

## 2022-12-19 RX ADMIN — ROCURONIUM BROMIDE 60 MG: 10 INJECTION, SOLUTION INTRAVENOUS at 07:36:00

## 2022-12-19 RX ADMIN — CLINDAMYCIN PHOSPHATE 900 MG: 900 INJECTION INTRAVENOUS at 07:47:00

## 2022-12-19 RX ADMIN — LIDOCAINE HYDROCHLORIDE ANHYDROUS AND DEXTROSE MONOHYDRATE 2 MG/KG/HR: .8; 5 INJECTION, SOLUTION INTRAVENOUS at 07:34:00

## 2022-12-19 RX ADMIN — SODIUM CHLORIDE, SODIUM LACTATE, POTASSIUM CHLORIDE, CALCIUM CHLORIDE: 600; 310; 30; 20 INJECTION, SOLUTION INTRAVENOUS at 10:25:00

## 2022-12-19 RX ADMIN — GLYCOPYRROLATE 0.8 MG: 0.2 INJECTION, SOLUTION INTRAMUSCULAR; INTRAVENOUS at 11:02:00

## 2022-12-19 RX ADMIN — ROCURONIUM BROMIDE 10 MG: 10 INJECTION, SOLUTION INTRAVENOUS at 08:47:00

## 2022-12-19 RX ADMIN — LIDOCAINE HYDROCHLORIDE 50 MG: 10 INJECTION, SOLUTION EPIDURAL; INFILTRATION; INTRACAUDAL; PERINEURAL at 07:34:00

## 2022-12-19 RX ADMIN — DEXAMETHASONE SODIUM PHOSPHATE 8 MG: 4 MG/ML VIAL (ML) INJECTION at 07:41:00

## 2022-12-19 NOTE — OPERATIVE REPORT
[facility]  Operative Note    Lynette Oliva Location: OR   CSN [id number] MRN [id number]   Admission Date 12/19/2022 Operation Date 12/19/2022   Attending Physician Tomasz Sargent MD Operating Physician Leslie Saab MD     Pre-Operative Diagnosis: Acute diverticulitis [K57.92]    Post-Operative Diagnosis: Same as above    Procedure Performed: Robotic low anterior resection of the rectosigmoid, with anastomosis. Mobilization of the splenic flexure    Surgeon(s) and Role:     Gurwinder Cristina MD - Primary     * Martin Bonilla MD - Assisting Surgeon  PA: BEREKET Vincent-C    The assistance of Dr. Haley Flores, was absolutely essential to the proper conduct of this case and further assisted with the lysis of adhesions, careful dissection of the colon off the pelvic sidewall, and was essential in the performance of the anastomosis as this required a surgeon above within the abdomen and below guiding and firing the EEA within the rectum. Anesthesia: General    History of present illness: This patient has had a continuous attack of diverticulitis for several weeks, continues antibiotics, , on antibiotics up until the day of surgery. Operative findings: This patient had 2 foci of diverticulitis. One was in the mid sigmoid, the other was near the descending sigmoid junction, more on the descending colon side. Both were active. We resected down to the rectosigmoid junction and up to the mid descending colon. We had to fully mobilized the splenic flexure. Although there was active inflammation and phlegmon, there is no pus, free fluid, or perforation. Uterus tubes and ovaries are normal.  There was a previous ventral incisional hernia repair with mesh in the central abdomen. We are able to avoid that with all trocar insertion sites, as well as her extraction site of the colon which was in the left midabdomen.   The patient underwent an uncomplicated double stapled 29 mm EEA anastomosis. The IC-Green testing showed good blood supply. The anastomosis was tension-free. Operative summary:      Following adequate general anesthesia, the patient was placed in the lithotomy position on the operating room table. The abdomen was scrubbed with chlorhexidine and sterile drapes were placed with wide exposure and xiphoid to pubis. A rectal irrigation catheter was placed, Doll catheter was placed. Timeout was performed confirming the patient's name, the operation, and the administration of antibiotics. An incision was made above and to the right of the umbilicus and a Veress needle was inserted into the abdominal cavity. The abdomen was insufflated with CO2. An 5 mm port trocar was placed via this incision. Under direct visualization 3 other 8 mm trochars were placed. They are in a diagonal alignment from the right lower quadrant up to the left subcostal margin. The original 5 mm port was removed and replaced under direct visualization with a fourth 8 mm trocar. The right lower quadrant 8 mm trocar was then replaced with a 12 mm trocar that can accommodate the stapler. A 5 mm assist port was placed in the right upper quadrant above the diagonal line used for the previous 4 ports. The entire abdomen was inspected carefully. We reviewed the pathology involved with the rectosigmoid colon. Please see the operative findings for the results of the diagnostic laparoscopy. We then docked the robot, and began with the procedure. We began with mobilization of the descending colon as well as any lateral abdominal sidewall attachments of the sigmoid colon to the left pelvic sidewall. We found it necessary to completely mobilize the splenic flexure. This was done by coming around the splenic flexure with scissors dissection. This was carried to the level of the transverse omentum. The entire left colon was mobilized from lateral to medial exposing Gerota's fascia. The entire splenic flexure attachments were taken down with electrocautery and scissors dissection. This allowed us to adequately to afford length to reach the pelvis regarding anastomosis. We then carefully identified the left ureter. This was done by fully mobilizing the sigmoid colon and rectum off of the left pelvic sidewall. We carried this dissection down to the level of the deep pelvis. This elevated the sigmoid and rectum off of the left pelvic sidewall. We then began to control the blood supply of the planned resected segment which includes the sigmoid colon and the proximal rectum. We started by making a rent in the mesentery near the level of the inferior mesenteric artery. The inferior mesenteric artery was carefully identified. The superior hemorrhoidal vessel was identified at its origin from the inferior mesenteric artery. It was carefully divided with the vessel sealer. The inferior mesenteric vein was likewise identified and also divided with the vessel sealer. The dissection was then carried down into the pelvis along the presacral fascia. We then dissected with electrocautery along the right mesorectum. We then took any sigmoid artery branch as necessary. Again this was all done at the level of the inferior mesenteric artery at its root. This freed up the segment of colon that was to be resected. This included the area of the active diverticulitis. Our plan resection is from the pathology within the left colon, all the way down to the mid and upper rectum. Once we were assured that the rectum was easily mobilized out of the pelvis, we exposed the site of the planned resection of the proximal rectum. We plan a resection well below any remaining diverticuli. Our goal is to remove the high-pressure zone between the rectum and sigmoid colon. The mesentery immediately around the resection margin was divided up to this level using the vessel sealer device. This cleared the planned site of division of the rectum of pericolic fat. This enabled a clear tissue plane to afford the WILL staple line. We then divided the mid to upper rectum with WILL stapler(s) at the planned point of resection and the planned point of anastomosis. We then thoroughly irrigated the lumen of the rectum with a balloon catheter that had been preoperatively placed. This helped this evaluate the rectal staple line for any defects. This also cleared the rectum of any fecal debris. A left lower quadrant transverse incision was made through the rectus region to afford removal of the cut portion of the bowel. The transverse skin incision was made. Skin and subcutaneous tissues were dissected down to the anterior fascia. The anterior fascia was divided. The rectus muscle was divided. Great caution was taken to secure the epigastric vessels and thoroughly cauterized them. The peritoneum was then entered. The cut portion of bowel was brought up to skin level and brought out into the operative field. We then resected the bowel at a level above the planned area of resection to include the pathologic portion of the colon. This was done by placing a Kocker across the bowel lumen, then dividing with sharp scalpel dissection. EEA sizers were used to select the correct size instrument for staple anastomosis. The EEA anvil was secured within the proximal cut colon with a pursestring suture of 2-0 Prolene suture. This external operative site was then thoroughly irrigated. The anvil within the cut portion of the colon was now secured and then reduced below the peritoneum. The left lower quadrant incision was then closed in layers. The deep layer to the posterior rectus fascia and peritoneum with #1 running Vicryl. The anterior fascia was closed with #1 PDS. Under laparoscopic visualization, the handle of the EEA instrument was introduced into the rectum.  The trocar was allowed to abbott the staple line through the rectum. The handle and anvil were reunited. A circular anastomosis was fired across the rectum in the standard double staple technique. We then evaluated the anastomosis in the following fashion    #1: The EEA rings were found to be intact    #2: Insufflation of air via proctoscopy with the proximal bowel occluded revealed no air leaks with the anastomosis under saline. #3: Direct visualization via proctoscopy reveals no disruption of the staple lines. There is excellent blood supply to the mucosal surfaces proximal and distal.    #4: External visualization via laparoscopy reveals no tension on the anastomotic site, no disruption of the staple line, and good blood supply both proximal and distal to the anastomosis. #5: IC-Green was injected systemically and there was good blood supply right up to the level of the anastomosis on both the rectal and descending colon side. The large Carter drain was brought through the right mid abdominal assist port trocar site. The belly and tip of the drain was placed in the pelvis. With the anastomosis intact, we began with closure of the incision. The large port site in the right lower quadrant was closed at the fascial level with interrupted 0 Maxon. All skin ports sites were closed with 5-0 undyed Vicryl in a subcuticular fashion. The extraction site was then closed with a deep layer of interrupted 3-0 Vicryl in a subcutaneous location. The extraction site incision was then closed with a running 5-0 undyed Vicryl in a subcuticular fashion. The drain was secured in place with 2-0 nylon. The drain was placed on self suction. 0.5% Marcaine with epinephrine was injected into all wound margins. Steri-Strips are placed over benzoin adhesive. The patient was delivered to recovery room in stable postoperative condition.         Pathologic specimens: Rectosigmoid colon    Estimated blood loss: 10 cc    Drains: One large Carter drain    Complications: None

## 2022-12-19 NOTE — INTERVAL H&P NOTE
Pre-op Diagnosis: Acute diverticulitis [K57.92]    The above referenced H&P was reviewed by Gerardo Thomas MD on 12/19/2022, the patient was examined and no significant changes have occurred in the patient's condition since the H&P was performed. I discussed with the patient and/or legal representative the potential benefits, risks and side effects of this procedure; the likelihood of the patient achieving goals; and potential problems that might occur during recuperation. I discussed reasonable alternatives to the procedure, including risks, benefits and side effects related to the alternatives and risks related to not receiving this procedure. We will proceed with procedure as planned.

## 2022-12-19 NOTE — ANESTHESIA PROCEDURE NOTES
Airway  Date/Time: 12/19/2022 7:38 AM  Urgency: elective      General Information and Staff    Patient location during procedure: OR  Anesthesiologist: Jessie Montero MD  Performed: anesthesiologist     Indications and Patient Condition  Indications for airway management: anesthesia  Sedation level: deep  Preoxygenated: yes  Patient position: sniffing  Mask difficulty assessment: 1 - vent by mask    Final Airway Details  Final airway type: endotracheal airway      Successful airway: ETT  Cuffed: yes   Successful intubation technique: Video laryngoscopy  Endotracheal tube insertion site: oral  Blade: GlideScope  Blade size: #3  ETT size (mm): 7.0    Placement verified by: chest auscultation and capnometry   Cuff volume (mL): 7  Measured from: lips  ETT to lips (cm): 21  Number of attempts at approach: 1    Additional Comments  atraumatic

## 2022-12-19 NOTE — ANESTHESIA POSTPROCEDURE EVALUATION
[address] [facility]. Patient Status:  Inpatient   Age/Gender 56year old female MRN [id number]   Location [address] [facility] Attending Melina Huang MD   Hosp Day # 0 PCP Farideh Merino MD       Anesthesia Post-op Note    ROBOTIC ASSISTED LAPAROSCOPIC LOW ANTERIOR COLON RESECTION OF RECTOSIGMOID COLON    Procedure Summary     Date: 12/19/22 Room / Location: [facility] MAIN OR 09 / [facility] MAIN OR    Anesthesia Start: 1999 Anesthesia Stop: 1127    Procedure: ROBOTIC ASSISTED LAPAROSCOPIC LOW ANTERIOR COLON RESECTION OF RECTOSIGMOID COLON Diagnosis:       Acute diverticulitis      (Acute diverticulitis [K57.92])    Surgeons: Melina Huang MD Anesthesiologist: Phil Muñoz MD    Anesthesia Type: general ASA Status: 2          Anesthesia Type: general    Vitals Value Taken Time   /67 12/19/22 1124   Temp 98.6 12/19/22 1127   Pulse 94 12/19/22 1126   Resp 19 12/19/22 1126   SpO2 94 % 12/19/22 1126   Vitals shown include unvalidated device data. Patient Location: PACU    Anesthesia Type: general    Airway Patency: patent    Postop Pain Control: adequate    Mental Status: mildly sedated but able to meaningfully participate in the post-anesthesia evaluation    Nausea/Vomiting: none    Cardiopulmonary/Hydration status: stable euvolemic    Complications: no apparent anesthesia related complications    Postop vital signs: stable    Dental Exam: Unchanged from Preop    Patient to be discharged from PACU when criteria met.

## 2022-12-19 NOTE — PROGRESS NOTES
NURSING ADMISSION NOTE      Patient admitted via Bed  Oriented to room. Safety precautions initiated. Bed in low position. Call light in reach. Patient arrived to floor from PACU at [phone] in stable condition.

## 2022-12-20 NOTE — PLAN OF CARE
A&Ox4. VSS. RA. . Denies chest pain and SOB. GI: Abdomen soft, nondistended. Denies passing gas. Bowel sounds active. Denies nausea. : DTV. Pain controlled with PRN pain medications. Up ad ratna. Drains: DARRYL drain to bulb suction. Incisions: surgical incisions intact--no srainage. Diet: Tolerating full liquids--moved to soft diet. All appropriate safety measures in place. All questions and concerns addressed. Will continue to monitor. Problem: PAIN - ADULT  Goal: Verbalizes/displays adequate comfort level or patient's stated pain goal  Description: INTERVENTIONS:  - Encourage pt to monitor pain and request assistance  - Assess pain using appropriate pain scale  - Administer analgesics based on type and severity of pain and evaluate response  - Implement non-pharmacological measures as appropriate and evaluate response  - Consider cultural and social influences on pain and pain management  - Manage/alleviate anxiety  - Utilize distraction and/or relaxation techniques  - Monitor for opioid side effects  - Notify MD/LIP if interventions unsuccessful or patient reports new pain  - Anticipate increased pain with activity and pre-medicate as appropriate  Outcome: Progressing     Problem: RISK FOR INFECTION - ADULT  Goal: Absence of fever/infection during anticipated neutropenic period  Description: INTERVENTIONS  - Monitor WBC  - Administer growth factors as ordered  - Implement neutropenic guidelines  Outcome: Progressing     Problem: SAFETY ADULT - FALL  Goal: Free from fall injury  Description: INTERVENTIONS:  - Assess pt frequently for physical needs  - Identify cognitive and physical deficits and behaviors that affect risk of falls.   - [place] fall precautions as indicated by assessment.  - Educate pt/family on patient safety including physical limitations  - Instruct pt to call for assistance with activity based on assessment  - Modify environment to reduce risk of injury  - Provide assistive devices as appropriate  - Consider OT/PT consult to assist with strengthening/mobility  - Encourage toileting schedule  Outcome: Progressing     Problem: DISCHARGE PLANNING  Goal: Discharge to home or other facility with appropriate resources  Description: INTERVENTIONS:  - Identify barriers to discharge w/pt and caregiver  - Include patient/family/discharge partner in discharge planning  - Arrange for needed discharge resources and transportation as appropriate  - Identify discharge learning needs (meds, wound care, etc)  - Arrange for interpreters to assist at discharge as needed  - Consider post-discharge preferences of patient/family/discharge partner  - Complete POLST form as appropriate  - Assess patient's ability to be responsible for managing their own health  - Refer to Case Management Department for coordinating discharge planning if the patient needs post-hospital services based on physician/LIP order or complex needs related to functional status, cognitive ability or social support system  Outcome: Progressing

## 2022-12-20 NOTE — PLAN OF CARE
Patient A&Ox4, RA, up w/ SB assist. Lap sites x4 intact. Patient denies passing gas. Tolerating full liquid diet. IVF and IV abx infusing per mar. Doll intact w/ clear, yellow urine. DARRYL drain intact w/ bloody drainage. Reporting moderate abdominal pain. Receiving norco for pain. Plan of care discussed w/ patient. Problem: PAIN - ADULT  Goal: Verbalizes/displays adequate comfort level or patient's stated pain goal  Description: INTERVENTIONS:  - Encourage pt to monitor pain and request assistance  - Assess pain using appropriate pain scale  - Administer analgesics based on type and severity of pain and evaluate response  - Implement non-pharmacological measures as appropriate and evaluate response  - Consider cultural and social influences on pain and pain management  - Manage/alleviate anxiety  - Utilize distraction and/or relaxation techniques  - Monitor for opioid side effects  - Notify MD/LIP if interventions unsuccessful or patient reports new pain  - Anticipate increased pain with activity and pre-medicate as appropriate  Outcome: Progressing     Problem: RISK FOR INFECTION - ADULT  Goal: Absence of fever/infection during anticipated neutropenic period  Description: INTERVENTIONS  - Monitor WBC  - Administer growth factors as ordered  - Implement neutropenic guidelines  Outcome: Progressing     Problem: SAFETY ADULT - FALL  Goal: Free from fall injury  Description: INTERVENTIONS:  - Assess pt frequently for physical needs  - Identify cognitive and physical deficits and behaviors that affect risk of falls.   - [place] fall precautions as indicated by assessment.  - Educate pt/family on patient safety including physical limitations  - Instruct pt to call for assistance with activity based on assessment  - Modify environment to reduce risk of injury  - Provide assistive devices as appropriate  - Consider OT/PT consult to assist with strengthening/mobility  - Encourage toileting schedule  Outcome: Progressing     Problem: DISCHARGE PLANNING  Goal: Discharge to home or other facility with appropriate resources  Description: INTERVENTIONS:  - Identify barriers to discharge w/pt and caregiver  - Include patient/family/discharge partner in discharge planning  - Arrange for needed discharge resources and transportation as appropriate  - Identify discharge learning needs (meds, wound care, etc)  - Arrange for interpreters to assist at discharge as needed  - Consider post-discharge preferences of patient/family/discharge partner  - Complete POLST form as appropriate  - Assess patient's ability to be responsible for managing their own health  - Refer to Case Management Department for coordinating discharge planning if the patient needs post-hospital services based on physician/LIP order or complex needs related to functional status, cognitive ability or social support system  Outcome: Progressing

## 2022-12-20 NOTE — PLAN OF CARE
PT axox4, resting in bed, [place] draining clear yellow urine, pt had low grade temp gave tylenol, has pain 6/10 gave oral pain meds, abdomen soft has three lap sites steri strips and LLQ lap site covered with gauze, lungs clear, pt care plan updated with patient will continue to monitor the patient. Problem: PAIN - ADULT  Goal: Verbalizes/displays adequate comfort level or patient's stated pain goal  Description: INTERVENTIONS:  - Encourage pt to monitor pain and request assistance  - Assess pain using appropriate pain scale  - Administer analgesics based on type and severity of pain and evaluate response  - Implement non-pharmacological measures as appropriate and evaluate response  - Consider cultural and social influences on pain and pain management  - Manage/alleviate anxiety  - Utilize distraction and/or relaxation techniques  - Monitor for opioid side effects  - Notify MD/LIP if interventions unsuccessful or patient reports new pain  - Anticipate increased pain with activity and pre-medicate as appropriate  Outcome: Progressing     Problem: RISK FOR INFECTION - ADULT  Goal: Absence of fever/infection during anticipated neutropenic period  Description: INTERVENTIONS  - Monitor WBC  - Administer growth factors as ordered  - Implement neutropenic guidelines  Outcome: Progressing     Problem: SAFETY ADULT - FALL  Goal: Free from fall injury  Description: INTERVENTIONS:  - Assess pt frequently for physical needs  - Identify cognitive and physical deficits and behaviors that affect risk of falls.   - [place] fall precautions as indicated by assessment.  - Educate pt/family on patient safety including physical limitations  - Instruct pt to call for assistance with activity based on assessment  - Modify environment to reduce risk of injury  - Provide assistive devices as appropriate  - Consider OT/PT consult to assist with strengthening/mobility  - Encourage toileting schedule  Outcome: Progressing     Problem: DISCHARGE PLANNING  Goal: Discharge to home or other facility with appropriate resources  Description: INTERVENTIONS:  - Identify barriers to discharge w/pt and caregiver  - Include patient/family/discharge partner in discharge planning  - Arrange for needed discharge resources and transportation as appropriate  - Identify discharge learning needs (meds, wound care, etc)  - Arrange for interpreters to assist at discharge as needed  - Consider post-discharge preferences of patient/family/discharge partner  - Complete POLST form as appropriate  - Assess patient's ability to be responsible for managing their own health  - Refer to Case Management Department for coordinating discharge planning if the patient needs post-hospital services based on physician/LIP order or complex needs related to functional status, cognitive ability or social support system  Outcome: Progressing

## 2022-12-21 VITALS
WEIGHT: 197 LBS | OXYGEN SATURATION: 93 % | BODY MASS INDEX: 30.92 KG/M2 | HEART RATE: 69 BPM | SYSTOLIC BLOOD PRESSURE: 107 MMHG | HEIGHT: 67 IN | TEMPERATURE: 99 F | RESPIRATION RATE: 18 BRPM | DIASTOLIC BLOOD PRESSURE: 74 MMHG

## 2022-12-21 PROCEDURE — 99239 HOSP IP/OBS DSCHRG MGMT >30: CPT | Performed by: COLON & RECTAL SURGERY

## 2022-12-21 RX ORDER — HYDROCODONE BITARTRATE AND ACETAMINOPHEN 5; 325 MG/1; MG/1
1 TABLET ORAL EVERY 6 HOURS PRN
Qty: 20 TABLET | Refills: 0 | Status: SHIPPED | OUTPATIENT
Start: 2022-12-21

## 2022-12-21 NOTE — DISCHARGE INSTRUCTIONS
Home Care Instructions  Robotic Laparoscopic Colectomy    DIET  You may have a decreased appetite. Do not force over eating. It is better to eat small frequent meals until your appetite has returned. Minimize high fat foods and dairy products in the immediate post-operative time period as this may cause some intestinal discomfort or loose stools. Eat a Low-Residue or Low-Fiber diet until you are seen in the office for follow up. You will soon be transitioned to a high fiber diet. There should be no alcohol consumption in the immediate recover time period or within six hours of taking narcotics. BOWEL MOVEMENTS  Your first few bowel movement may be very loose. They will thicken up as you begin to eat more normal food. You may see some spotting of blood in the first few bowel movements, this is normal after surgery. Please notify the office if you are passing entire bowel movements of blood. If taking narcotic medications, you may experience constipation. If you have not had a bowel movement by the third day after surgery, take Miralax 15g (one cap full) mixed in 8oz water every 6 hours until you have a bowel movement. If another 24 hours goes by without a bowel movement, then you can take a dose of magnesium citrate or milk of magnesia. PAIN CONTROL  You may experience pain at the incisions. You may experience pain in the shoulders. This is due to irritation of the diaphragm by the air used to inflate the abdomen. You may experience cramping abdominal pain as your intestines begin to function after surgery. You can take Tylenol 1000mg (2 Extra Strength Tylenol) every 8 hours or naproxen (Alleve, Naprosyn) 400 - 500 mg every 12 hours for mild to moderate pain. For moderate to severe pain you will be prescribed Norco. Take one pill every six hours as needed for pain. If you do not feel that narcotics are necessary you should not take them.      MEDICATIONS  Please ask your surgeon before resuming blood thinners such as aspirin, plavix, coumadin, Eliquis, or Xarelto. All other home medications may be resumed as scheduled. WOUND CARE  There are absorbable sutures under the skin that will dissolve over time. Your incisions are covered with Steri-strips. No additional dressings or bandages are needed. Do not remove the white steri-strips that are adherent to the skin. The steri-strips will eventually peel away from the skin and at that point they may be gently removed. This is usually seven to fourteen days after surgery. Otherwise they will be removed in the office. You may shower the day after surgery. There is no need to cover the incisions. Soap can get on the incisions. Do not scrub the incisions. No hair dye or chemicals of any kind should get on the incisions. Do not submerge the incisions under water (bathing, swimming) for two weeks. Do not apply Neosporin or other ointments to the incisions. ACTIVITY  You may have low energy for a few days after surgery. Your energy will increase with time. Every day you should be up out of bed, showered and dressed. Do not stay in bed all day. You should be up and walking around the house frequently. Walking helps to avoid pneumonia and blood clots in the legs. You can also go to the store or walk outside to get out of the house and to stay active. Do not drive for at least a week. Do not drive within 8 hours of taking a narcotic medication (Norco). You must be able to react quickly to avoid a traffic accident without pain before you can drive. The average time off work or school  is 7 to 14 days. You may walk up and down stairs and lift up to fifteen pounds but no bending, pushing or pulling. APPOINTMENT  Please call our office as soon as possible for an appointment at [phone]. Please call our office immediately for fever greater than 100.5, extensive bleeding, inability to urinate.   For life threatening emergencies such as severe chest pain, shortness of breath, loss of conciousness call 911. For non-emergent care please call our office after 8:30 a.m. Monday through Friday. The number above directs to the answering service after hours to reach the on-call physician.

## 2022-12-21 NOTE — PLAN OF CARE
Patients IV dc'd, catheter intact. DARRYL instructions explained, supplies given. All discharge instructions explained, all questions answered. Patient will be discharged via wheelchair with support staff.

## 2022-12-21 NOTE — PLAN OF CARE
Pt vitals stable, A&O x4. Pt denied chest pain, shortness of breath, and calf pain. Pt stated passing gas, but no BM at this time. Laps x3 and low transverse noted to abdomen. DARRYL to right abdomen. Tolerating diet. Bed locked in low position, call light in reach, rounding provided. Problem: PAIN - ADULT  Goal: Verbalizes/displays adequate comfort level or patient's stated pain goal  Description: INTERVENTIONS:  - Encourage pt to monitor pain and request assistance  - Assess pain using appropriate pain scale  - Administer analgesics based on type and severity of pain and evaluate response  - Implement non-pharmacological measures as appropriate and evaluate response  - Consider cultural and social influences on pain and pain management  - Manage/alleviate anxiety  - Utilize distraction and/or relaxation techniques  - Monitor for opioid side effects  - Notify MD/LIP if interventions unsuccessful or patient reports new pain  - Anticipate increased pain with activity and pre-medicate as appropriate  Outcome: Progressing     Problem: RISK FOR INFECTION - ADULT  Goal: Absence of fever/infection during anticipated neutropenic period  Description: INTERVENTIONS  - Monitor WBC  - Administer growth factors as ordered  - Implement neutropenic guidelines  Outcome: Progressing     Problem: SAFETY ADULT - FALL  Goal: Free from fall injury  Description: INTERVENTIONS:  - Assess pt frequently for physical needs  - Identify cognitive and physical deficits and behaviors that affect risk of falls.   - [place] fall precautions as indicated by assessment.  - Educate pt/family on patient safety including physical limitations  - Instruct pt to call for assistance with activity based on assessment  - Modify environment to reduce risk of injury  - Provide assistive devices as appropriate  - Consider OT/PT consult to assist with strengthening/mobility  - Encourage toileting schedule  Outcome: Progressing     Problem: DISCHARGE PLANNING  Goal: Discharge to home or other facility with appropriate resources  Description: INTERVENTIONS:  - Identify barriers to discharge w/pt and caregiver  - Include patient/family/discharge partner in discharge planning  - Arrange for needed discharge resources and transportation as appropriate  - Identify discharge learning needs (meds, wound care, etc)  - Arrange for interpreters to assist at discharge as needed  - Consider post-discharge preferences of patient/family/discharge partner  - Complete POLST form as appropriate  - Assess patient's ability to be responsible for managing their own health  - Refer to Case Management Department for coordinating discharge planning if the patient needs post-hospital services based on physician/LIP order or complex needs related to functional status, cognitive ability or social support system  Outcome: Progressing

## 2022-12-21 NOTE — PLAN OF CARE
Patient ambulating in halls independently. VSS. Denies chest/calf pain. Incisional sites c/d/i. DARRYL compressed and milked. . Passing flatus. Denies BM. POC discussed, all questions and concerns addressed. All safety measures in place. Will monitor.

## 2022-12-21 NOTE — PLAN OF CARE
Problem: PAIN - ADULT  Goal: Verbalizes/displays adequate comfort level or patient's stated pain goal  Description: INTERVENTIONS:  - Encourage pt to monitor pain and request assistance  - Assess pain using appropriate pain scale  - Administer analgesics based on type and severity of pain and evaluate response  - Implement non-pharmacological measures as appropriate and evaluate response  - Consider cultural and social influences on pain and pain management  - Manage/alleviate anxiety  - Utilize distraction and/or relaxation techniques  - Monitor for opioid side effects  - Notify MD/LIP if interventions unsuccessful or patient reports new pain  - Anticipate increased pain with activity and pre-medicate as appropriate  Outcome: Progressing     Problem: RISK FOR INFECTION - ADULT  Goal: Absence of fever/infection during anticipated neutropenic period  Description: INTERVENTIONS  - Monitor WBC  - Administer growth factors as ordered  - Implement neutropenic guidelines  Outcome: Progressing     Problem: SAFETY ADULT - FALL  Goal: Free from fall injury  Description: INTERVENTIONS:  - Assess pt frequently for physical needs  - Identify cognitive and physical deficits and behaviors that affect risk of falls.   - [place] fall precautions as indicated by assessment.  - Educate pt/family on patient safety including physical limitations  - Instruct pt to call for assistance with activity based on assessment  - Modify environment to reduce risk of injury  - Provide assistive devices as appropriate  - Consider OT/PT consult to assist with strengthening/mobility  - Encourage toileting schedule  Outcome: Progressing     Problem: DISCHARGE PLANNING  Goal: Discharge to home or other facility with appropriate resources  Description: INTERVENTIONS:  - Identify barriers to discharge w/pt and caregiver  - Include patient/family/discharge partner in discharge planning  - Arrange for needed discharge resources and transportation as appropriate  - Identify discharge learning needs (meds, wound care, etc)  - Arrange for interpreters to assist at discharge as needed  - Consider post-discharge preferences of patient/family/discharge partner  - Complete POLST form as appropriate  - Assess patient's ability to be responsible for managing their own health  - Refer to Case Management Department for coordinating discharge planning if the patient needs post-hospital services based on physician/LIP order or complex needs related to functional status, cognitive ability or social support system  Outcome: Progressing

## 2022-12-22 ENCOUNTER — TELEPHONE (OUTPATIENT)
Dept: FAMILY MEDICINE CLINIC | Facility: CLINIC | Age: 57
End: 2022-12-22

## 2022-12-22 ENCOUNTER — PATIENT OUTREACH (OUTPATIENT)
Dept: CASE MANAGEMENT | Age: 57
End: 2022-12-22

## 2022-12-22 DIAGNOSIS — Z02.9 ENCOUNTERS FOR ADMINISTRATIVE PURPOSE: ICD-10-CM

## 2022-12-22 NOTE — TELEPHONE ENCOUNTER
GOKUL, Spoke to pt for TCM today. Pt does not have HFU appt scheduled at this time. Pt declined to schedule stating that she is following up with surgeon on 12/27/22 and is doing well. She states that she will call back in January to schedule her yearly. TCM/HFU appt recommended by 1/4/23 as pt is a moderate risk for readmission.      BOOK BY DATE (last date for TCM): 1/4/23

## 2022-12-27 ENCOUNTER — OFFICE VISIT (OUTPATIENT)
Facility: LOCATION | Age: 57
End: 2022-12-27

## 2022-12-27 DIAGNOSIS — K57.92 ACUTE DIVERTICULITIS: Primary | ICD-10-CM

## 2022-12-27 PROCEDURE — 99024 POSTOP FOLLOW-UP VISIT: CPT | Performed by: COLON & RECTAL SURGERY

## 2022-12-28 NOTE — DISCHARGE SUMMARY
[facility]  Discharge Summary    [address] [facility] Patient Status:  Inpatient    3/27/1965 MRN [id number]   [facility] 3NW-A Attending No att. providers found   [address] Day # 2 PCP Facundo Lundberg MD     Date of Admission: 2022    Date of Discharge: 2022    Admitting Diagnosis: Acute diverticulitis [K57.92]     Patient Active Problem List:     Family history of breast cancer in first degree relative     Menopause     Primary osteoarthritis of right knee     Internal derangement of knee, right     Otosclerosis     Vitamin D insufficiency     DDD (degenerative disc disease), cervical     Arthropathy of cervical facet joint     Diverticula of colon     Acute diverticulitis      Reason for Admission:  Acute diverticulitis [K57.92]     Procedures: ROBOTIC ASSISTED LAPAROSCOPIC LOW ANTERIOR COLON RESECTION OF RECTOSIGMOID COLON    History of Present Illness: Acute diverticulitis [K57.92]  This patient has multiple episodes acute diverticulitis presents at this time for robotic low anterior section of rectosigmoid colon with anastomosis. Hospital Course: The patient tolerated the above listed procedure without complication. The patient is being discharged with the following physical exam.      Physical Exam: Abdomen soft, non-tender, good bowel sounds. Incisions clean, dry, intact, no erythema. Consultations: None    Complications: none     Disposition: Home to self care   Discharge Condition: Good    Discharge Medications: Discharge Medication List as of 2022  2:42 PM    START taking these medications    HYDROcodone-acetaminophen (NORCO) 5-325 MG Oral Tab  Take 1 tablet by mouth every 6 (six) hours as needed for Pain., Normal, Disp-20 tablet, R-0      CONTINUE these medications which have NOT CHANGED    cholecalciferol 25 MCG (1000 UT) Oral Tab  Take 1,000 Units by mouth daily. , Historical    zinc sulfate 220 (50 Zn) MG Oral Cap  Take 220 mg by mouth daily. , Historical    Cyanocobalamin (VITAMIN B 12 OR)  Take 1 tablet by mouth daily. , Historical    Ascorbic Acid (VITAMIN C OR)  Take 1 tablet by mouth daily. , Historical    ondansetron 4 MG Oral Tablet Dispersible  Take 1 tablet (4 mg total) by mouth every 8 (eight) hours as needed for Nausea., Normal, Disp-30 tablet, R-0          Follow up Visits: Follow-up with Gosia Beavers in approximately the next 5-7 days.      Other Discharge Instructions:    General diet  No lifting, no driving, the patient may shower  [place] for pain  Home with drain    Gosia Beavers MD  12/28/2022  4:20 PM

## 2023-02-09 ENCOUNTER — PATIENT MESSAGE (OUTPATIENT)
Dept: FAMILY MEDICINE CLINIC | Facility: CLINIC | Age: 58
End: 2023-02-09

## 2023-02-09 ENCOUNTER — TELEPHONE (OUTPATIENT)
Dept: FAMILY MEDICINE CLINIC | Facility: CLINIC | Age: 58
End: 2023-02-09

## 2023-02-09 DIAGNOSIS — H43.399 VITREOUS FLOATERS, UNSPECIFIED LATERALITY: Primary | ICD-10-CM

## 2023-02-09 NOTE — TELEPHONE ENCOUNTER
From: Latasha Major  To: Juan Antonio Mayfield MD  Sent: 2/9/2023 9:16 AM CST  Subject: Eye Issue    Good morning    Starting yesterday I have a floater in my right eye with flashes when I blink. I called my eye doc he referred me to [facility] who informed me since I needed a referral I needed to contact you because [facility]. Please let me know who to call as the flashes when I blink concern me as well as the floaters being annoying! Thank you!  I look forward to hearing from you

## 2023-02-09 NOTE — TELEPHONE ENCOUNTER
Patient sent a [facility] message this am, needing a referral. Was referred to [facility] but was told based on her HMO she would need to see an EDW provider, please advise. Would like a call back to get the floaters/flashers looked at.

## 2023-02-14 ENCOUNTER — OFFICE VISIT (OUTPATIENT)
Dept: FAMILY MEDICINE CLINIC | Facility: CLINIC | Age: 58
End: 2023-02-14
Payer: COMMERCIAL

## 2023-02-14 ENCOUNTER — LAB ENCOUNTER (OUTPATIENT)
Dept: LAB | Age: 58
End: 2023-02-14
Attending: FAMILY MEDICINE
Payer: COMMERCIAL

## 2023-02-14 VITALS
BODY MASS INDEX: 30.92 KG/M2 | HEIGHT: 67 IN | RESPIRATION RATE: 16 BRPM | SYSTOLIC BLOOD PRESSURE: 108 MMHG | OXYGEN SATURATION: 100 % | WEIGHT: 197 LBS | HEART RATE: 94 BPM | TEMPERATURE: 98 F | DIASTOLIC BLOOD PRESSURE: 70 MMHG

## 2023-02-14 DIAGNOSIS — Z12.31 ENCOUNTER FOR SCREENING MAMMOGRAM FOR MALIGNANT NEOPLASM OF BREAST: ICD-10-CM

## 2023-02-14 DIAGNOSIS — E55.9 VITAMIN D INSUFFICIENCY: ICD-10-CM

## 2023-02-14 DIAGNOSIS — Z00.00 ROUTINE MEDICAL EXAM: Primary | ICD-10-CM

## 2023-02-14 DIAGNOSIS — Z00.00 LABORATORY EXAM ORDERED AS PART OF ROUTINE GENERAL MEDICAL EXAMINATION: ICD-10-CM

## 2023-02-14 PROBLEM — K57.30 DIVERTICULA OF COLON: Status: RESOLVED | Noted: 2022-10-21 | Resolved: 2023-02-14

## 2023-02-14 PROBLEM — H52.13 MYOPIA OF BOTH EYES: Status: ACTIVE | Noted: 2023-02-14

## 2023-02-14 PROBLEM — K57.92 ACUTE DIVERTICULITIS: Status: RESOLVED | Noted: 2022-10-21 | Resolved: 2023-02-14

## 2023-02-14 PROBLEM — H43.819 VITREOUS DEGENERATION: Status: ACTIVE | Noted: 2023-02-14

## 2023-02-14 LAB
ALBUMIN SERPL-MCNC: 4.1 G/DL (ref 3.4–5)
ALBUMIN/GLOB SERPL: 1 {RATIO} (ref 1–2)
ALP LIVER SERPL-CCNC: 74 U/L
ALT SERPL-CCNC: 33 U/L
ANION GAP SERPL CALC-SCNC: 4 MMOL/L (ref 0–18)
AST SERPL-CCNC: 25 U/L (ref 15–37)
BASOPHILS # BLD AUTO: 0.05 X10(3) UL (ref 0–0.2)
BASOPHILS NFR BLD AUTO: 0.7 %
BILIRUB SERPL-MCNC: 0.5 MG/DL (ref 0.1–2)
BUN BLD-MCNC: 12 MG/DL (ref 7–18)
CALCIUM BLD-MCNC: 9.4 MG/DL (ref 8.5–10.1)
CHLORIDE SERPL-SCNC: 109 MMOL/L (ref 98–112)
CHOLEST SERPL-MCNC: 193 MG/DL (ref ?–200)
CO2 SERPL-SCNC: 27 MMOL/L (ref 21–32)
CREAT BLD-MCNC: 0.71 MG/DL
EOSINOPHIL # BLD AUTO: 0.19 X10(3) UL (ref 0–0.7)
EOSINOPHIL NFR BLD AUTO: 2.7 %
ERYTHROCYTE [DISTWIDTH] IN BLOOD BY AUTOMATED COUNT: 14.1 %
FASTING PATIENT LIPID ANSWER: YES
FASTING STATUS PATIENT QL REPORTED: YES
GFR SERPLBLD BASED ON 1.73 SQ M-ARVRAT: 100 ML/MIN/1.73M2 (ref 60–?)
GLOBULIN PLAS-MCNC: 4 G/DL (ref 2.8–4.4)
GLUCOSE BLD-MCNC: 100 MG/DL (ref 70–99)
HCT VFR BLD AUTO: 44.7 %
HDLC SERPL-MCNC: 71 MG/DL (ref 40–59)
HGB BLD-MCNC: 14.3 G/DL
IMM GRANULOCYTES # BLD AUTO: 0.01 X10(3) UL (ref 0–1)
IMM GRANULOCYTES NFR BLD: 0.1 %
LDLC SERPL CALC-MCNC: 105 MG/DL (ref ?–100)
LYMPHOCYTES # BLD AUTO: 1.83 X10(3) UL (ref 1–4)
LYMPHOCYTES NFR BLD AUTO: 25.7 %
MCH RBC QN AUTO: 29.2 PG (ref 26–34)
MCHC RBC AUTO-ENTMCNC: 32 G/DL (ref 31–37)
MCV RBC AUTO: 91.4 FL
MONOCYTES # BLD AUTO: 0.46 X10(3) UL (ref 0.1–1)
MONOCYTES NFR BLD AUTO: 6.5 %
NEUTROPHILS # BLD AUTO: 4.59 X10 (3) UL (ref 1.5–7.7)
NEUTROPHILS # BLD AUTO: 4.59 X10(3) UL (ref 1.5–7.7)
NEUTROPHILS NFR BLD AUTO: 64.3 %
NONHDLC SERPL-MCNC: 122 MG/DL (ref ?–130)
OSMOLALITY SERPL CALC.SUM OF ELEC: 290 MOSM/KG (ref 275–295)
PLATELET # BLD AUTO: 332 10(3)UL (ref 150–450)
POTASSIUM SERPL-SCNC: 4.3 MMOL/L (ref 3.5–5.1)
PROT SERPL-MCNC: 8.1 G/DL (ref 6.4–8.2)
RBC # BLD AUTO: 4.89 X10(6)UL
SODIUM SERPL-SCNC: 140 MMOL/L (ref 136–145)
TRIGL SERPL-MCNC: 93 MG/DL (ref 30–149)
VIT D+METAB SERPL-MCNC: 21.4 NG/ML (ref 30–100)
VLDLC SERPL CALC-MCNC: 16 MG/DL (ref 0–30)
WBC # BLD AUTO: 7.1 X10(3) UL (ref 4–11)

## 2023-02-14 PROCEDURE — 99396 PREV VISIT EST AGE 40-64: CPT | Performed by: FAMILY MEDICINE

## 2023-02-14 PROCEDURE — 36415 COLL VENOUS BLD VENIPUNCTURE: CPT

## 2023-02-14 PROCEDURE — 80053 COMPREHEN METABOLIC PANEL: CPT

## 2023-02-14 PROCEDURE — G0438 PPPS, INITIAL VISIT: HCPCS | Performed by: FAMILY MEDICINE

## 2023-02-14 PROCEDURE — 3074F SYST BP LT 130 MM HG: CPT | Performed by: FAMILY MEDICINE

## 2023-02-14 PROCEDURE — 85025 COMPLETE CBC W/AUTO DIFF WBC: CPT

## 2023-02-14 PROCEDURE — 3008F BODY MASS INDEX DOCD: CPT | Performed by: FAMILY MEDICINE

## 2023-02-14 PROCEDURE — 80061 LIPID PANEL: CPT

## 2023-02-14 PROCEDURE — 82306 VITAMIN D 25 HYDROXY: CPT

## 2023-02-14 PROCEDURE — 3078F DIAST BP <80 MM HG: CPT | Performed by: FAMILY MEDICINE

## 2023-02-14 RX ORDER — MULTIVITAMIN
1 TABLET ORAL DAILY
COMMUNITY

## 2023-03-10 ENCOUNTER — HOSPITAL ENCOUNTER (OUTPATIENT)
Dept: MAMMOGRAPHY | Age: 58
Discharge: HOME | End: 2023-03-10
Attending: FAMILY MEDICINE
Payer: COMMERCIAL

## 2023-03-10 DIAGNOSIS — Z12.31 ENCOUNTER FOR SCREENING MAMMOGRAM FOR MALIGNANT NEOPLASM OF BREAST: ICD-10-CM

## 2023-03-10 PROCEDURE — 77063 BREAST TOMOSYNTHESIS BI: CPT | Performed by: FAMILY MEDICINE

## 2023-03-10 PROCEDURE — 77067 SCR MAMMO BI INCL CAD: CPT | Performed by: FAMILY MEDICINE

## 2023-11-15 ENCOUNTER — PATIENT MESSAGE (OUTPATIENT)
Dept: FAMILY MEDICINE CLINIC | Facility: CLINIC | Age: 58
End: 2023-11-15

## 2023-11-16 NOTE — TELEPHONE ENCOUNTER
From: Letha Major  To: Murphy Obed  Sent: 11/15/2023 11:35 AM CST  Subject: Annual Physical    Hello!     I usually come in Jan for my annual physical. I saw last year I came in Feb. Do I need to wait until Feb to schedule or can I come in Jan?

## 2024-01-25 ENCOUNTER — OFFICE VISIT (OUTPATIENT)
Dept: FAMILY MEDICINE CLINIC | Facility: CLINIC | Age: 59
End: 2024-01-25
Payer: COMMERCIAL

## 2024-01-25 VITALS
DIASTOLIC BLOOD PRESSURE: 70 MMHG | WEIGHT: 214 LBS | OXYGEN SATURATION: 99 % | TEMPERATURE: 98 F | SYSTOLIC BLOOD PRESSURE: 110 MMHG | HEIGHT: 67 IN | HEART RATE: 92 BPM | RESPIRATION RATE: 16 BRPM | BODY MASS INDEX: 33.59 KG/M2

## 2024-01-25 DIAGNOSIS — Z23 NEED FOR VACCINATION: ICD-10-CM

## 2024-01-25 DIAGNOSIS — Z12.11 COLON CANCER SCREENING: ICD-10-CM

## 2024-01-25 DIAGNOSIS — E55.9 VITAMIN D INSUFFICIENCY: ICD-10-CM

## 2024-01-25 DIAGNOSIS — Z00.00 ROUTINE MEDICAL EXAM: Primary | ICD-10-CM

## 2024-01-25 DIAGNOSIS — Z12.4 SCREENING FOR CERVICAL CANCER: ICD-10-CM

## 2024-01-25 DIAGNOSIS — Z12.31 ENCOUNTER FOR SCREENING MAMMOGRAM FOR MALIGNANT NEOPLASM OF BREAST: ICD-10-CM

## 2024-01-25 DIAGNOSIS — K62.5 BRBPR (BRIGHT RED BLOOD PER RECTUM): ICD-10-CM

## 2024-01-25 DIAGNOSIS — Z00.00 LABORATORY EXAM ORDERED AS PART OF ROUTINE GENERAL MEDICAL EXAMINATION: ICD-10-CM

## 2024-01-25 PROCEDURE — 3074F SYST BP LT 130 MM HG: CPT | Performed by: FAMILY MEDICINE

## 2024-01-25 PROCEDURE — 3078F DIAST BP <80 MM HG: CPT | Performed by: FAMILY MEDICINE

## 2024-01-25 PROCEDURE — 87624 HPV HI-RISK TYP POOLED RSLT: CPT | Performed by: FAMILY MEDICINE

## 2024-01-25 PROCEDURE — 90471 IMMUNIZATION ADMIN: CPT | Performed by: FAMILY MEDICINE

## 2024-01-25 PROCEDURE — 88175 CYTOPATH C/V AUTO FLUID REDO: CPT | Performed by: FAMILY MEDICINE

## 2024-01-25 PROCEDURE — 99396 PREV VISIT EST AGE 40-64: CPT | Performed by: FAMILY MEDICINE

## 2024-01-25 PROCEDURE — 3008F BODY MASS INDEX DOCD: CPT | Performed by: FAMILY MEDICINE

## 2024-01-25 PROCEDURE — 90686 IIV4 VACC NO PRSV 0.5 ML IM: CPT | Performed by: FAMILY MEDICINE

## 2024-01-25 NOTE — PROGRESS NOTES
Chief Complaint   Patient presents with    Physical    Pap    Immunization/Injection     Influenza vaccine today.       HPI:  Diane Major is a 58 year old female here today for preventative visit.      Imms- up to date with covid, PPSV 23, and tdap. Will discuss shingrix, & flu. Found out her insurance does cover shingrix but at office only.        Cervical cancer screening- No h/o abnormal paps, HPV, or genital warts. Normal co-testing 1/22/19 with Dr. Sahara Armando, repeat 5 yrs. Due.        Breast cancer screening-  Both her mother and sister had breast cancer. normal mammogram on 3/10/23.        Colon cancer screening- No early family h/o colon cancer. C-scope done 7/7/17 with Dr. Everardo Olsen (gen surg), repeat 10 yrs. Had a bowel resection 12/27/22 due to diverticulitis and healing well        Osteoporosis screening- dexa 1/28/21 normal.        Diet/exercise- got an exercise bike, as an empty bettina so can eat better without kids at home.        Dental/Eye Check up-  Recommended pt see dentist once every 6 months for a cleaning and once every year for an eye exam.        Rectal bleeding- Normally has 4 BMs in a row then is done until the next day. + drops in the toilet bowl last We  First BM was hard, mild straining, hada pain in the RLQ that quickly resolved then   Has an appt with Dr. Lewis for Monday.      Denies- abd pain, weight loss,           Past Medical History:   Diagnosis Date    DDD (degenerative disc disease), cervical     Diverticulitis     s/p 14 inches resection    Esophageal reflux     Floaters 2023    Hearing impairment     s/p stapedectomy, Dr. Banks.  very [place] left ear    Menopause age 50    Mitral valve prolapse     Old torn meniscus of knee     R, saw Dr. Lombardi PONV (postoperative nausea and vomiting)     Visual impairment     glasses/contacts    Vitamin D insufficiency 2019     Past Surgical History:   Procedure Laterality Date    BOWEL RESECTION  12/27/2022      Darren 2/2 diverticulitis 14 inches    CHOLECYSTECTOMY      COLONOSCOPY  07/07/2017    repeat 10 yrs, Dr. Everardo Olsen (gen surg)    COLONOSCOPY  2017    EAR AND THROAT EXAMINATION  2009    stepedectomy (inner ear surgery), Dr. Banks    HERNIA SURGERY      umbilical with mesh the 2nd time, Dr. Faust out of [facility]    OTHER SURGICAL HISTORY  2022    colorectal surgery    REMOVAL GALLBLADDER      REPAIR ING HERNIA,5+Y/O,REDUCIBL       Current Outpatient Medications on File Prior to Visit   Medication Sig Dispense Refill    ELDERBERRY OR Take 1 tablet by mouth daily.      Multiple Vitamin (ONE-DAILY MULTI VITAMINS) Oral Tab Take 1 tablet by mouth daily.      cholecalciferol 25 MCG (1000 UT) Oral Tab Take 1 tablet (1,000 Units total) by mouth daily.      zinc sulfate 220 (50 Zn) MG Oral Cap Take 1 capsule (220 mg total) by mouth daily.      Cyanocobalamin (VITAMIN B 12 OR) Take 1 tablet by mouth daily.      Ascorbic Acid (VITAMIN C OR) Take 1 tablet by mouth daily.       No current facility-administered medications on file prior to visit.     Allergies   Allergen Reactions    Zosyn [Piperacillin Sod-Tazobactam So] HIVES and ITCHING    Codeine NAUSEA AND VOMITING    Adhesive Tape RASH     From electrode pads     Social History     Socioeconomic History    Marital status:      Spouse name: Not on file    Number of children: Not on file    Years of education: Not on file    Highest education level: Not on file   Occupational History    Not on file   Tobacco Use    Smoking status: Never    Smokeless tobacco: Never   Vaping Use    Vaping Use: Never used   Substance and Sexual Activity    Alcohol use: Yes     Alcohol/week: 4.0 standard drinks of alcohol     Types: 4 Glasses of wine per week     Comment: red [id number] 4 nights/wk, never a problem    Drug use: No    Sexual activity: Yes     Partners: Male     Birth control/protection: Vasectomy   Other Topics Concern    Caffeine Concern No    Exercise No     Seat Belt No    Special Diet No    Stress Concern No    Weight Concern No   Social History Narrative    Not on file     Social Determinants of Health     Financial Resource Strain: Not on file   Food Insecurity: Not on file   Transportation Needs: Not on file   Physical Activity: Not on file   Stress: Not on file   Social Connections: Not on file   Housing Stability: Not on file     Family History   Problem Relation Age of Onset    Breast Cancer Mother 55        L mastectomy    High Blood Pressure Mother     Diabetes Mother     COPD Mother         never smoked, but owned a restraunt    Other (a fib) Mother     Cancer Mother     Heart Disorder Mother         AFIB    Pulmonary Disease Mother         COPD    Cancer Father         esophegeal, brain    Breast Cancer Sister 45    Other (multiple sclerosis) Sister 40    No Known Problems Maternal Grandmother     Heart Attack Maternal Grandfather     No Known Problems Paternal Grandmother     Heart Attack Paternal Grandfather        Review of Systems - All systems reviewed and negative except for HPI    PHYSICAL EXAM:  /70   Pulse 92   Temp 97.9 °F (36.6 °C) (Temporal)   Resp 16   Ht 5' 7\" (1.702 m)   Wt 214 lb (97.1 kg)   LMP  (LMP Unknown)   SpO2 99%   BMI 33.52 kg/m²   GENERAL APPEARANCE:  Alert, no acute distress, appears stated age  HEENT:  Head- Normocephalic, atraumatic.    Eyes- Extraocular movements intact, pupils equally round and reactive to light,  conjunctivae normal.    Ears- Tympanic membranes intact bilaterally.    Nose- Patent, normal septum and turbinates.    Mouth/Throat- Normal oral mucosa, throat non-erythematous.  NECK:  No submental, submandibular, ant/post cervical lymphadenopathy. No thyromegaly or masses.  PULMONARY:  Lungs clear to auscultation bilaterally. No wheezes, rales, or rhonchi. Normal respiratory effort.  CARDIOVASCULAR:  Regular rate and rhythm. No murmurs, gallops, or rubs.  ABDOMEN:  + bowel sounds, soft, nontender,  nondistended. No hepatomegaly.  MUSCULOSKELETAL: Strength of upper and lower extremities 5/5 bilaterally. Normal gait.  NEUROLOGIC:  Cranial nerves 2-12 grossly intact.  PSYCHIATRIC:  Normal mood, affect, and hygiene.     ASSESSMENT/PLAN:    1. Routine medical exam    2. Screening for cervical cancer  - ThinPrep PAP Smear; Future  - Hpv High Risk , Thin Prep Collection; Future    3. Encounter for screening mammogram for malignant neoplasm of breast  - [facility] LIEN 2D+3D SCREENING BILAT (CPT=77067/91959); Future    4. Colon cancer screening  -up to date with c-scope, but see below    5. Laboratory exam ordered as part of routine general medical examination  - CBC W Differential W Platelet [E]; Future  - Comp Metabolic Panel (14) [E]; Future  - Lipid Panel [E]; Future    6. Need for vaccination  - Immunization Admin Counseling, 1st Component, 18 years and older  - Fluzone Quadrivalent 6mo and older, 0.5mL    7. Vitamin D insufficiency  - Vitamin D [E]; Future    8. BRBPR (bright red blood per rectum)  + internal hemorrhoid  -recommended seeing , but suspect internal hemorrhoid      Patient verbalized understanding and agrees to plan.      Return in about 1 year (around 1/25/2025) for annual physical, we will contact you with results.

## 2024-01-26 ENCOUNTER — TELEPHONE (OUTPATIENT)
Dept: ADMINISTRATIVE | Age: 59
End: 2024-01-26

## 2024-01-26 DIAGNOSIS — K62.5 BRBPR (BRIGHT RED BLOOD PER RECTUM): ICD-10-CM

## 2024-01-26 DIAGNOSIS — K63.9 COLON ABNORMALITY: Primary | ICD-10-CM

## 2024-01-26 LAB — HPV I/H RISK 1 DNA SPEC QL NAA+PROBE: NEGATIVE

## 2024-01-26 NOTE — TELEPHONE ENCOUNTER
Patient request new referral to see General surgeon,Suzanne Higgins,Please review and sign plan and care if you agree Thank you.      [facility] REFERRALS.

## 2024-01-29 ENCOUNTER — OFFICE VISIT (OUTPATIENT)
Facility: LOCATION | Age: 59
End: 2024-01-29
Payer: COMMERCIAL

## 2024-01-29 VITALS — HEART RATE: 92 BPM | TEMPERATURE: 98 F

## 2024-01-29 DIAGNOSIS — K62.5 RECTAL BLEEDING: Primary | ICD-10-CM

## 2024-01-29 PROCEDURE — 99213 OFFICE O/P EST LOW 20 MIN: CPT | Performed by: STUDENT IN AN ORGANIZED HEALTH CARE EDUCATION/TRAINING PROGRAM

## 2024-01-29 PROCEDURE — 46600 DIAGNOSTIC ANOSCOPY SPX: CPT | Performed by: STUDENT IN AN ORGANIZED HEALTH CARE EDUCATION/TRAINING PROGRAM

## 2024-01-29 RX ORDER — POLYETHYLENE GLYCOL 3350, SODIUM CHLORIDE, SODIUM BICARBONATE, POTASSIUM CHLORIDE 420; 11.2; 5.72; 1.48 G/4L; G/4L; G/4L; G/4L
POWDER, FOR SOLUTION ORAL
Qty: 1 EACH | Refills: 0 | Status: SHIPPED | OUTPATIENT
Start: 2024-01-29

## 2024-01-30 LAB
.: NORMAL
.: NORMAL

## 2024-02-01 ENCOUNTER — LAB ENCOUNTER (OUTPATIENT)
Dept: LAB | Age: 59
End: 2024-02-01
Attending: FAMILY MEDICINE
Payer: COMMERCIAL

## 2024-02-01 DIAGNOSIS — Z00.00 LABORATORY EXAM ORDERED AS PART OF ROUTINE GENERAL MEDICAL EXAMINATION: ICD-10-CM

## 2024-02-01 DIAGNOSIS — E55.9 VITAMIN D INSUFFICIENCY: ICD-10-CM

## 2024-02-01 DIAGNOSIS — R73.09 ELEVATED GLUCOSE: ICD-10-CM

## 2024-02-01 LAB
ALBUMIN SERPL-MCNC: 4.1 G/DL (ref 3.4–5)
ALBUMIN/GLOB SERPL: 1.1 {RATIO} (ref 1–2)
ALP LIVER SERPL-CCNC: 77 U/L
ALT SERPL-CCNC: 43 U/L
ANION GAP SERPL CALC-SCNC: 3 MMOL/L (ref 0–18)
AST SERPL-CCNC: 33 U/L (ref 15–37)
BASOPHILS # BLD AUTO: 0.06 X10(3) UL (ref 0–0.2)
BASOPHILS NFR BLD AUTO: 1.2 %
BILIRUB SERPL-MCNC: 0.6 MG/DL (ref 0.1–2)
BUN BLD-MCNC: 11 MG/DL (ref 9–23)
CALCIUM BLD-MCNC: 9.2 MG/DL (ref 8.5–10.1)
CHLORIDE SERPL-SCNC: 112 MMOL/L (ref 98–112)
CHOLEST SERPL-MCNC: 193 MG/DL (ref ?–200)
CO2 SERPL-SCNC: 26 MMOL/L (ref 21–32)
CREAT BLD-MCNC: 0.81 MG/DL
EGFRCR SERPLBLD CKD-EPI 2021: 85 ML/MIN/1.73M2 (ref 60–?)
EOSINOPHIL # BLD AUTO: 0.19 X10(3) UL (ref 0–0.7)
EOSINOPHIL NFR BLD AUTO: 3.9 %
ERYTHROCYTE [DISTWIDTH] IN BLOOD BY AUTOMATED COUNT: 13.8 %
FASTING PATIENT LIPID ANSWER: YES
FASTING STATUS PATIENT QL REPORTED: YES
GLOBULIN PLAS-MCNC: 3.7 G/DL (ref 2.8–4.4)
GLUCOSE BLD-MCNC: 100 MG/DL (ref 70–99)
HCT VFR BLD AUTO: 42.1 %
HDLC SERPL-MCNC: 80 MG/DL (ref 40–59)
HGB BLD-MCNC: 14 G/DL
IMM GRANULOCYTES # BLD AUTO: 0.01 X10(3) UL (ref 0–1)
IMM GRANULOCYTES NFR BLD: 0.2 %
LDLC SERPL CALC-MCNC: 97 MG/DL (ref ?–100)
LYMPHOCYTES # BLD AUTO: 1.66 X10(3) UL (ref 1–4)
LYMPHOCYTES NFR BLD AUTO: 33.7 %
MCH RBC QN AUTO: 29.4 PG (ref 26–34)
MCHC RBC AUTO-ENTMCNC: 33.3 G/DL (ref 31–37)
MCV RBC AUTO: 88.3 FL
MONOCYTES # BLD AUTO: 0.37 X10(3) UL (ref 0.1–1)
MONOCYTES NFR BLD AUTO: 7.5 %
NEUTROPHILS # BLD AUTO: 2.64 X10 (3) UL (ref 1.5–7.7)
NEUTROPHILS # BLD AUTO: 2.64 X10(3) UL (ref 1.5–7.7)
NEUTROPHILS NFR BLD AUTO: 53.5 %
NONHDLC SERPL-MCNC: 113 MG/DL (ref ?–130)
OSMOLALITY SERPL CALC.SUM OF ELEC: 291 MOSM/KG (ref 275–295)
PLATELET # BLD AUTO: 256 10(3)UL (ref 150–450)
POTASSIUM SERPL-SCNC: 4 MMOL/L (ref 3.5–5.1)
PROT SERPL-MCNC: 7.8 G/DL (ref 6.4–8.2)
RBC # BLD AUTO: 4.77 X10(6)UL
SODIUM SERPL-SCNC: 141 MMOL/L (ref 136–145)
TRIGL SERPL-MCNC: 91 MG/DL (ref 30–149)
VIT D+METAB SERPL-MCNC: 45.9 NG/ML (ref 30–100)
VLDLC SERPL CALC-MCNC: 15 MG/DL (ref 0–30)
WBC # BLD AUTO: 4.9 X10(3) UL (ref 4–11)

## 2024-02-01 PROCEDURE — 83036 HEMOGLOBIN GLYCOSYLATED A1C: CPT

## 2024-02-01 PROCEDURE — 36415 COLL VENOUS BLD VENIPUNCTURE: CPT

## 2024-02-01 PROCEDURE — 80053 COMPREHEN METABOLIC PANEL: CPT

## 2024-02-01 PROCEDURE — 85025 COMPLETE CBC W/AUTO DIFF WBC: CPT

## 2024-02-01 PROCEDURE — 80061 LIPID PANEL: CPT

## 2024-02-01 PROCEDURE — 82306 VITAMIN D 25 HYDROXY: CPT

## 2024-02-01 NOTE — H&P
New Patient Visit Note       Active Problems      1. Rectal bleeding        Chief Complaint   Chief Complaint   Patient presents with    New Patient     NP - Blood in stool recently 2 days in a row, no bleeding since, Last cscope at 53yo- Prv DJP pt       History of Present Illness   This is a very nice 58-year-old female previously known to Dr. Patel and Dr. Banks.  Patient has a history of recurrent diverticulitis.  She underwent a colonoscopy with Dr. Patel on 7/7/2017.  She was found to have diverticulosis and internal hemorrhoids and was given a 10-year recall.  Patient underwent robotic low anterior resection with Dr. Banks on 12/19/2022.  She did well after the surgery and last saw Dr. Banks on 12/27/2022.  She was told to follow-up as needed at that time.    Patient presents to me because she has seen blood in her stool on 2 occasions this month.  She recalls being constipated with some straining when this bleeding happened.  She denies any rectal pain, prolapse, itching, seepage or incontinence.  She denies any persistent change in bowel habits, anemia or unintentional weight loss.  No family history of colon or rectal cancer.  No blood thinners.    Allergies  Diane is allergic to zosyn [piperacillin sod-tazobactam so], codeine, and adhesive tape.    Past Medical / Surgical / Social / Family History    The past medical and past surgical history have been reviewed by me today.    Past Medical History:   Diagnosis Date    DDD (degenerative disc disease), cervical     Diverticulitis     s/p 14 inches resection    Esophageal reflux     Floaters 2023    Hearing impairment     s/p stapedectomy, Dr. Banks.  very [place] left ear    Menopause age 50    Mitral valve prolapse     Old torn meniscus of knee     R, saw Dr. Lombardi PONV (postoperative nausea and vomiting)     Visual impairment     glasses/contacts    Vitamin D insufficiency 2019     Past Surgical History:   Procedure Laterality Date    BOWEL  RESECTION  12/27/2022    Dr. Banks 2/2 diverticulitis 14 inches    CHOLECYSTECTOMY      COLONOSCOPY  07/07/2017    repeat 10 yrs, Dr. Everardo Olsen (gen surg)    COLONOSCOPY  2017    EAR AND THROAT EXAMINATION  2009    stepedectomy (inner ear surgery), Dr. Banks    HERNIA SURGERY      umbilical with mesh the 2nd time, Dr. Faust out of [facility]    OTHER SURGICAL HISTORY  2022    colorectal surgery    REMOVAL GALLBLADDER      REPAIR ING HERNIA,5+Y/O,REDUCIBL         The family history and social history have been reviewed by me today.    Family History   Problem Relation Age of Onset    Breast Cancer Mother 55        L mastectomy    High Blood Pressure Mother     Diabetes Mother     COPD Mother         never smoked, but owned a restraunt    Other (a fib) Mother     Cancer Mother     Heart Disorder Mother         AFIB    Pulmonary Disease Mother         COPD    Cancer Father         esophegeal, brain    Breast Cancer Sister 45    Other (multiple sclerosis) Sister 40    No Known Problems Maternal Grandmother     Heart Attack Maternal Grandfather     No Known Problems Paternal Grandmother     Heart Attack Paternal Grandfather      Social History     Socioeconomic History    Marital status:    Tobacco Use    Smoking status: Never    Smokeless tobacco: Never   Vaping Use    Vaping Use: Never used   Substance and Sexual Activity    Alcohol use: Yes     Alcohol/week: 4.0 standard drinks of alcohol     Types: 4 Glasses of wine per week     Comment: red [id number] 4 nights/wk, never a problem    Drug use: No    Sexual activity: Yes     Partners: Male     Birth control/protection: Vasectomy   Other Topics Concern    Caffeine Concern No    Exercise No    Seat Belt No    Special Diet No    Stress Concern No    Weight Concern No        Current Outpatient Medications:     PEG 3350-KCl-Na Bicarb-NaCl (TRILYTE) 420 g Oral Recon Soln, Starting at 4:00 pm the night before procedure, drink 8 ounces of the prep every 15-20  minutes until finished, Disp: 1 each, Rfl: 0    ELDERBERRY OR, Take 1 tablet by mouth daily., Disp: , Rfl:     Multiple Vitamin (ONE-DAILY MULTI VITAMINS) Oral Tab, Take 1 tablet by mouth daily., Disp: , Rfl:     cholecalciferol 25 MCG (1000 UT) Oral Tab, Take 1 tablet (1,000 Units total) by mouth daily., Disp: , Rfl:     zinc sulfate 220 (50 Zn) MG Oral Cap, Take 1 capsule (220 mg total) by mouth daily., Disp: , Rfl:     Cyanocobalamin (VITAMIN B 12 OR), Take 1 tablet by mouth daily., Disp: , Rfl:     Ascorbic Acid (VITAMIN C OR), Take 1 tablet by mouth daily., Disp: , Rfl:       Review of Systems  A 10 point review of systems was performed and negative unless otherwise documented per HPI.    Physical Findings   Pulse 92   Temp 97.7 °F (36.5 °C)   LMP  (LMP Unknown)   Physical Exam  Vitals and nursing note reviewed. Exam conducted with a chaperone present.   Constitutional:       General: She is not in acute distress.  HENT:      Head: Normocephalic and atraumatic.      Mouth/Throat:      Mouth: Mucous membranes are moist.   Cardiovascular:      Rate and Rhythm: Normal rate and regular rhythm.   Pulmonary:      Effort: Pulmonary effort is normal.   Abdominal:      General: There is no distension.      Palpations: Abdomen is soft.      Tenderness: There is no abdominal tenderness.   Genitourinary:     Comments: Patient examined in the prone jackknife position with female medical assistant chaperone present.  External exam of the anus is normal.  Digital rectal exam reveals good tone without any palpable masses and no bleeding.  Anoscopy reveals small internal hemorrhoids.  Musculoskeletal:         General: No deformity.   Skin:     General: Skin is warm and dry.   Neurological:      General: No focal deficit present.      Mental Status: She is alert.   Psychiatric:         Mood and Affect: Mood normal.             Assessment   1. Rectal bleeding        This is a very nice 58-year-old female previously known to   Amanda and Dr. Banks.  Patient has a history of recurrent diverticulitis.  She underwent a colonoscopy with Dr. Patel on 7/7/2017.  She was found to have diverticulosis and internal hemorrhoids and was given a 10-year recall.  Patient underwent robotic low anterior resection with Dr. Banks on 12/19/2022.  She did well after the surgery and last saw Dr. Banks on 12/27/2022.  She was told to follow-up as needed at that time.    Patient presents to me because she has seen blood in her stool on 2 occasions this month.  She recalls being constipated with some straining when this bleeding happened.  She denies any rectal pain, prolapse, itching, seepage or incontinence.  She denies any persistent change in bowel habits, anemia or unintentional weight loss.  No family history of colon or rectal cancer.  No blood thinners.    External exam of the anus is normal.  Digital rectal exam reveals good tone without any palpable masses and no bleeding.  Anoscopy reveals small internal hemorrhoids.    Plan  I recommend planning for a colonoscopy to rule out any large polyps or malignancy in the setting of rectal bleeding.  The details of the colonoscopy procedure were discussed including the prep instructions, risks, benefits and alternatives.  Patient expressed understanding and colonoscopy has been scheduled for 5/28/2024.     No orders of the defined types were placed in this encounter.      Imaging & Referrals   None    Follow Up  No follow-ups on file.    Ronaldo Lewis MD

## 2024-02-01 NOTE — PROCEDURES
Procedure: Anoscopy    Surgeon: Ronaldo Lewis    Anesthesia: None    Findings: See the progress note attached for all findings    Operative Summary: The patient was placed in a prone position on the proctoscopy table, the hips were flexed in the jackknife position.    Using a well-lubricated finger, digital rectal exam was performed in anticipation of the anoscope.    The anoscope was then inserted under direct visualization.    Excellent visualization was performed in a 360° fashion.    The scope was removed. The patient was taken out of the prone, jackknife, position.     The patient tolerated the procedure well.

## 2024-02-04 DIAGNOSIS — R73.09 ELEVATED GLUCOSE: Primary | ICD-10-CM

## 2024-02-04 LAB
EST. AVERAGE GLUCOSE BLD GHB EST-MCNC: 117 MG/DL (ref 68–126)
HBA1C MFR BLD: 5.7 % (ref ?–5.7)

## 2024-02-08 PROBLEM — R73.01 IFG (IMPAIRED FASTING GLUCOSE): Status: ACTIVE | Noted: 2024-02-08

## 2024-02-09 ENCOUNTER — PATIENT MESSAGE (OUTPATIENT)
Dept: FAMILY MEDICINE CLINIC | Facility: CLINIC | Age: 59
End: 2024-02-09

## 2024-02-09 ENCOUNTER — TELEPHONE (OUTPATIENT)
Dept: FAMILY MEDICINE CLINIC | Facility: CLINIC | Age: 59
End: 2024-02-09

## 2024-02-09 NOTE — TELEPHONE ENCOUNTER
----- Message from Debbie Luna MD sent at 2/8/2024  7:35 PM CST -----  Results reviewed. Please advise patient to schedule an appt to discuss results- pre-diabetic

## 2024-02-09 NOTE — TELEPHONE ENCOUNTER
From: Hermelinda NOBLE  To: Diane Major  Sent: 2/9/2024 8:03 AM CST  Subject: Test Results/Appointment    Good morning, Diane.  Dr. Luna has reviewed your recent lab tests and would like you to schedule an appointment with her to discuss the results. Please either call the office at [phone] to schedule, or you can schedule via [facility].Thank you,  Hermelinda, [facility]  Patient Services Representative Lead

## 2024-02-10 ENCOUNTER — OFFICE VISIT (OUTPATIENT)
Dept: FAMILY MEDICINE CLINIC | Facility: CLINIC | Age: 59
End: 2024-02-10
Payer: COMMERCIAL

## 2024-02-10 VITALS
SYSTOLIC BLOOD PRESSURE: 120 MMHG | WEIGHT: 212 LBS | RESPIRATION RATE: 16 BRPM | HEIGHT: 67 IN | HEART RATE: 94 BPM | OXYGEN SATURATION: 99 % | TEMPERATURE: 98 F | BODY MASS INDEX: 33.27 KG/M2 | DIASTOLIC BLOOD PRESSURE: 70 MMHG

## 2024-02-10 DIAGNOSIS — R73.01 IFG (IMPAIRED FASTING GLUCOSE): Primary | ICD-10-CM

## 2024-02-10 NOTE — PROGRESS NOTES
[facility] Visit Note  2/10/2024      Subjective:      Patient ID: Diane Major is a 58 year old female.    Chief Complaint:  Chief Complaint   Patient presents with    Lab Results     Done 2/1/24       HPI:  Diane Major is a 58 year old female who is being seen today for the above.      IFG- A1c 5.7 on 2/1/24. Starting a mediterranean diet.          Review of Systems - as stated above in the HPI      Objective:     Vitals:    02/10/24 0741   BP: 120/70   Pulse: 94   Resp: 16   Temp: 97.9 °F (36.6 °C)   TempSrc: Temporal   SpO2: 99%   Weight: 212 lb (96.2 kg)   Height: 5' 7\" (1.702 m)       Physical Examination   General:  Alert, in no acute distress  HEENT: NCAT, EOMI, mucus membranes moist   Neck:  No cervical lymphadenopathy  CV: Regular rate and rhythm. No murmurs, gallops, or rubs.  Lungs:  Clear to auscultation B, no wheezes, rales, or rhonchi, normal respiratory effort  Abd:  +bowel sounds, soft  Ext:  No pedal edema,  Pedal pulses 2+ B        Assessment:     1. IFG (impaired fasting glucose)  - Hemoglobin A1C [E]; Future  -discussed diet/exercise        Return in about 6 months (around 8/10/2024) for f/u prediabetes.

## 2024-03-11 ENCOUNTER — TELEPHONE (OUTPATIENT)
Facility: LOCATION | Age: 59
End: 2024-03-11

## 2024-03-13 ENCOUNTER — HOSPITAL ENCOUNTER (OUTPATIENT)
Dept: MAMMOGRAPHY | Age: 59
Discharge: HOME | End: 2024-03-13
Attending: FAMILY MEDICINE
Payer: COMMERCIAL

## 2024-03-13 DIAGNOSIS — Z12.31 ENCOUNTER FOR SCREENING MAMMOGRAM FOR MALIGNANT NEOPLASM OF BREAST: ICD-10-CM

## 2024-03-13 PROCEDURE — 77067 SCR MAMMO BI INCL CAD: CPT | Performed by: FAMILY MEDICINE

## 2024-03-13 PROCEDURE — 77063 BREAST TOMOSYNTHESIS BI: CPT | Performed by: FAMILY MEDICINE

## 2024-05-02 ENCOUNTER — TELEPHONE (OUTPATIENT)
Facility: LOCATION | Age: 59
End: 2024-05-02

## 2024-05-02 DIAGNOSIS — K62.5 RECTAL BLEEDING: Primary | ICD-10-CM

## 2024-06-03 ENCOUNTER — PATIENT MESSAGE (OUTPATIENT)
Dept: FAMILY MEDICINE CLINIC | Facility: CLINIC | Age: 59
End: 2024-06-03

## 2024-06-12 ENCOUNTER — TELEPHONE (OUTPATIENT)
Dept: FAMILY MEDICINE CLINIC | Facility: CLINIC | Age: 59
End: 2024-06-12

## 2024-06-12 ENCOUNTER — OFFICE VISIT (OUTPATIENT)
Dept: FAMILY MEDICINE CLINIC | Facility: CLINIC | Age: 59
End: 2024-06-12
Payer: COMMERCIAL

## 2024-06-12 VITALS
RESPIRATION RATE: 18 BRPM | BODY MASS INDEX: 31.67 KG/M2 | HEART RATE: 105 BPM | WEIGHT: 201.81 LBS | OXYGEN SATURATION: 99 % | SYSTOLIC BLOOD PRESSURE: 110 MMHG | HEIGHT: 67 IN | TEMPERATURE: 98 F | DIASTOLIC BLOOD PRESSURE: 68 MMHG

## 2024-06-12 DIAGNOSIS — R04.0 BLEEDING FROM THE NOSE: ICD-10-CM

## 2024-06-12 DIAGNOSIS — J30.2 SEASONAL ALLERGIC RHINITIS, UNSPECIFIED TRIGGER: Primary | ICD-10-CM

## 2024-06-12 PROCEDURE — 3008F BODY MASS INDEX DOCD: CPT | Performed by: FAMILY MEDICINE

## 2024-06-12 PROCEDURE — 3074F SYST BP LT 130 MM HG: CPT | Performed by: FAMILY MEDICINE

## 2024-06-12 PROCEDURE — 3078F DIAST BP <80 MM HG: CPT | Performed by: FAMILY MEDICINE

## 2024-06-12 PROCEDURE — 99213 OFFICE O/P EST LOW 20 MIN: CPT | Performed by: FAMILY MEDICINE

## 2024-06-12 NOTE — PATIENT INSTRUCTIONS
Nosebleed (Adult)    Bleeding from the nose most commonly occurs because of injury or drying and cracking of the inner lining of the nose. Most nosebleeds are because of dry air or nose-picking. They can occur during a common cold or an allergy attack. They can also occur on a very hot day, or from dry air in the winter. Most nosebleeds are not serious and are located towards the front of the nose. Nosebleeds that are located toward the back of the nose, closer to the throat, are less common, but may be harder to control and become more serious.   In cases where the bleeding is not able to be controlled, the site of the bleeding is found, and may be cauterized. This means it's treated to cause a blood clot to form. This may be done with a chemical, heat, or electricity. If the bleeding continues after the site is cauterized, or if the site can't be found, packing may be put in your nose. This is to apply pressure and stop the bleeding. The packing may be made of gauze or sponge. A small balloon catheter is sometimes used. These must be removed by your healthcare provider. Some types of packing dissolve on their own. In rare cases, surgery or embolization (sealing of the nose blood vessels using a catheter through an artery) is needed to stop a nosebleed. If you are taking blood thinning (anticoagulant) medicine, you may have a blood test and be advised to stop taking certain medicines for a while.   Home care  If packing was put in your nose, unless told otherwise, don't pull on it or try to remove it yourself. You will be given an appointment to have it removed. You may also have been given antibiotics to prevent a sinus infection. If so, finish all of the medicine.  Don't blow your nose for 12 hours after the bleeding stops. This will allow a strong blood clot to form. After that, if you have to blow your nose, do it very gently. Don't pick your nose. This may restart bleeding.  Don't drink alcohol or hot liquids  for the next 2 days. Alcohol or hot liquids in your mouth can dilate blood vessels in your nose. This can cause bleeding to start again.  Don't take ibuprofen, naproxen, or medicines that contain aspirin. These thin the blood and may cause your nose to bleed. You may take acetaminophen for pain, unless another pain medicine was prescribed. Talk with your healthcare provider before using acetaminophen if you have chronic liver disease.  If the bleeding starts again, sit up and lean forward to prevent swallowing blood. Pinch your nose tightly on both sides, as shown above, for 10 to 15 minutes. Time yourself. Don’t release the pressure on your nose until at least 10 minutes is up. If bleeding doesn't stop, continue to pinch your nose and, if the bleeding is a small amount, call your healthcare provider. If bleeding is heavy, call 911 or return to this facility.  If you have a cold, allergies, or dry nasal membranes, lubricate the nasal passages. Gently apply a small amount of petroleum jelly inside the nose with a cotton swab twice a day (morning and night).  Don't overheat your home. This can dry the air and make your condition worse.  Put a humidifier in the room where you sleep. This will add moisture to the air. Clean the humidifier as advised by the .  Use a saline nasal spray to keep nasal passages moist.  Don't pick your nose. Keep fingernails trimmed to decrease risk of bleeds.  Don't smoke.     Follow-up care  Follow up with your healthcare provider, or as advised. Nasal packing should be rechecked or removed within 2 to 3 days.   When to get medical advice  Call your healthcare provider right away if any of these occur.   You have intermittent, recurrent, small amounts of bleeding that you can control for a while  Fever of 100.4ºF (38ºC) or higher, or as directed by your healthcare provider  Headache  Sinus or facial pain  Call 911  Call 911 or get medical care right away if any of the following  occur:   Dizziness, weakness, or fainting  Shortness of breath or trouble breathing  You have another nosebleed that you can't control, or with heavy bleeding  You become abnormally tired or confused  Marianna last reviewed this educational content on 7/1/2022 © 2000-2024 [employer]. All rights reserved. This information is not intended as a substitute for professional medical care. Always follow your healthcare professional's instructions.    Nosebleed  The skin inside your nose is fragile and filled with blood vessels. That's why even a slight injury to your nose sometimes may cause bleeding. Hard nose blowing, dry winter air, colds, and nose-picking can also cause nosebleeds. Medicines such as warfarin, aspirin, and other blood thinners can make it more likely to have a nosebleed that's hard to stop. Normally, nosebleeds aren't a cause for concern. But in some cases, they can mean that you have a more serious health problem. Know when to get medical care for a nosebleed.  When to go to the emergency room (ER)  Most nosebleeds aren’t a medical emergency. In fact, you often can treat them yourself. But see your healthcare provider if you have nosebleeds often. And get medical care right away if you:  Have a head injury  Have bleeding that lasts more than 15 to 30 minutes or is severe  Feel weak or faint  Have trouble breathing  What to expect in the ER  You'll be examined and may have blood tests.  You may be given medicated nose drops to stop the nosebleed.  The healthcare provider may pack gauze into your nose to put pressure on the vessel and help stop bleeding.  The bleeding vessel may be cauterized. During this procedure, the vessel is burned with an electrical device or chemical. Your nose is first numbed so you won’t feel any pain.  In rare cases, you may need surgery to control the bleeding.     Home care for a nosebleed    Don't blow your nose for 12 hours after the bleeding stops. This  will let a strong blood clot form. Don't pick your nose. This may restart bleeding.  Don't drink alcohol or hot liquids for the next 2 days. Alcohol and hot liquids can dilate blood vessels in your nose. This can cause bleeding to start again.  Don't take ibuprofen, naproxen, or medicines that contain aspirin. These thin the blood and may cause your nose to bleed. You may take acetaminophen for pain, unless another pain medicine was prescribed.  If the bleeding starts again, sit up and lean forward to prevent swallowing blood. Pinch your nose tightly on both sides for 10 to 15 minutes. Time yourself. Don’t release the pressure on your nose until 10 minutes is up. If bleeding doesn't stop, continue to pinch your nose. Call your healthcare provider.  If you have a cold, allergies, or dry nasal membranes, lubricate the nasal passages. Apply a small amount of petroleum jelly inside the nose with a cotton swab twice a day (morning and night).  Don't overheat your home. This can dry the air and make your condition worse.  Put a humidifier in the room where you sleep. This will add moisture to the air.  Use a saline nasal spray to keep nasal passages moist.  Don't pick your nose. Keep fingernails trimmed to decrease risk of bleeds.  Don't smoke. Stay away from secondhand smoke. Don't let people smoke in your home.  Follow all other home care instructions from your healthcare provider.  Call your healthcare provider if you have any questions or concerns.  Soldsie last reviewed this educational content on 10/1/2021  © 0693-3877 [employer]. All rights reserved. This information is not intended as a substitute for professional medical care. Always follow your healthcare professional's instructions.

## 2024-06-12 NOTE — TELEPHONE ENCOUNTER
Patient called and said she had a nose bleed yesterday just out of the blue and it lasted about 30 minutes and the rest of the evening she was fine.  This morning it happened again.  She wants to know if it is ok for her to drive?  Please call her to let her know either way.       Future Appointments   Date Time Provider Department Center   6/12/2024  3:00 PM Robert Sorenson APRN [facility] [address]

## 2024-06-12 NOTE — TELEPHONE ENCOUNTER
[facility] sent to patient    Future Appointments   Date Time Provider Department Center   6/12/2024  3:00 PM Robert Sorenson, CEASAR [facility] [address]

## 2024-06-14 NOTE — PROGRESS NOTES
Subjective:   Diane Major is a 59 year old female who presents for Epistaxis (Patient states last night her nose starting bleeding unexpectedly and lasted for awhile, after she got the bleeding under control she then proceeded to cough up a blood clot. This morning the same situation occurred. Patient states has not had a bloody nose since this morning.)   Patient states that she has had two nosebleeds in the last 24 hours and she has never had any issues with nose bleeds. Patient states that the bleeding was controlled both times within 10-15 minutes. Patient states that she did not have any other symptoms of headache, dizziness, blurred vision, before the bleeding started. Patient denies any history of elevated blood pressure. Patient states that her allergies have been worse in the last week or so. Patient has not taken any medication to help with the allergies.     History/Other:    Chief Complaint Reviewed and Verified  Nursing Notes Reviewed and   Verified  Tobacco Reviewed  Allergies Reviewed  Medications Reviewed    OB Status Reviewed  Social History Reviewed         Tobacco:  She has never smoked tobacco.    Current Outpatient Medications   Medication Sig Dispense Refill    ELDERBERRY OR Take 1 tablet by mouth daily.      Multiple Vitamin (ONE-DAILY MULTI VITAMINS) Oral Tab Take 1 tablet by mouth daily.      cholecalciferol 25 MCG (1000 UT) Oral Tab Take 1 tablet (1,000 Units total) by mouth daily.      zinc sulfate 220 (50 Zn) MG Oral Cap Take 1 capsule (220 mg total) by mouth daily.      Cyanocobalamin (VITAMIN B 12 OR) Take 1 tablet by mouth daily.      Ascorbic Acid (VITAMIN C OR) Take 1 tablet by mouth daily.      PEG 3350-KCl-Na Bicarb-NaCl (TRILYTE) 420 g Oral Recon Soln Starting at 4:00 pm the night before procedure, drink 8 ounces of the prep every 15-20 minutes until finished (Patient not taking: Reported on 2/10/2024) 1 each 0         Review of Systems:  Review of Systems    Constitutional: Negative.    HENT:  Positive for nosebleeds (x2).    Respiratory: Negative.     Cardiovascular: Negative.    Gastrointestinal: Negative.    Skin: Negative.    Neurological: Negative.        Objective:   /68 (BP Location: Left arm, Patient Position: Sitting, Cuff Size: adult)   Pulse 105   Temp 97.8 °F (36.6 °C) (Temporal)   Resp 18   Ht 5' 7\" (1.702 m)   Wt 201 lb 12.8 oz (91.5 kg)   LMP  (LMP Unknown)   SpO2 99%   BMI 31.61 kg/m²  Estimated body mass index is 31.61 kg/m² as calculated from the following:    Height as of this encounter: 5' 7\" (1.702 m).    Weight as of this encounter: 201 lb 12.8 oz (91.5 kg).  Physical Exam  Constitutional:       Appearance: She is well-developed.   HENT:      Head: Normocephalic and atraumatic.      Nose: No nasal deformity, septal deviation, signs of injury, laceration, nasal tenderness, mucosal edema, congestion or rhinorrhea.      Right Nostril: Epistaxis present. No foreign body, septal hematoma or occlusion.      Left Nostril: Epistaxis present. No foreign body, septal hematoma or occlusion.      Right Turbinates: Enlarged and swollen.      Left Turbinates: Enlarged and swollen.      Right Sinus: No maxillary sinus tenderness or frontal sinus tenderness.      Left Sinus: No maxillary sinus tenderness or frontal sinus tenderness.      Mouth/Throat:      Mouth: Mucous membranes are moist.      Pharynx: Oropharynx is clear.   Eyes:      Conjunctiva/sclera: Conjunctivae normal.   Cardiovascular:      Rate and Rhythm: Normal rate.   Pulmonary:      Effort: Pulmonary effort is normal.   Skin:     General: Skin is warm and dry.   Neurological:      General: No focal deficit present.      Mental Status: She is alert and oriented to person, place, and time.   Psychiatric:         Mood and Affect: Mood normal.         Behavior: Behavior normal.         Thought Content: Thought content normal.         Judgment: Judgment normal.         Assessment & Plan:    1. Seasonal allergic rhinitis, unspecified trigger (Primary)  Start daily antihistamine: Claritin, Zyrtec, Allegra are all good options. Generic is fine.  Avoid allergy triggers  Follow up if symptoms worsen or new onset of fever    2. Bleeding from the nose  -     ENT - INTERNAL  Follow discharge instructions.   If unable to stop bleeding in 20 mins or bleeding is more than previous go to ER immediately.   Call ENT to schedule appointment.  Discussed using a humidifier in room.   Montior blood pressure to ensure no rise in bp     CEASAR [place], 6/14/2024, 1:44 PM

## 2024-07-01 ENCOUNTER — TELEPHONE (OUTPATIENT)
Facility: LOCATION | Age: 59
End: 2024-07-01

## 2024-07-01 DIAGNOSIS — K62.5 RECTAL BLEEDING: Primary | ICD-10-CM

## 2024-08-06 ENCOUNTER — LAB ENCOUNTER (OUTPATIENT)
Dept: LAB | Age: 59
End: 2024-08-06
Attending: FAMILY MEDICINE
Payer: COMMERCIAL

## 2024-08-06 DIAGNOSIS — R73.01 IFG (IMPAIRED FASTING GLUCOSE): ICD-10-CM

## 2024-08-06 LAB
EST. AVERAGE GLUCOSE BLD GHB EST-MCNC: 111 MG/DL (ref 68–126)
HBA1C MFR BLD: 5.5 % (ref ?–5.7)

## 2024-08-06 PROCEDURE — 83036 HEMOGLOBIN GLYCOSYLATED A1C: CPT

## 2024-08-06 PROCEDURE — 36415 COLL VENOUS BLD VENIPUNCTURE: CPT

## 2024-08-08 ENCOUNTER — APPOINTMENT (OUTPATIENT)
Dept: GENERAL RADIOLOGY | Age: 59
End: 2024-08-08
Attending: NURSE PRACTITIONER
Payer: COMMERCIAL

## 2024-08-08 ENCOUNTER — HOSPITAL ENCOUNTER (OUTPATIENT)
Age: 59
Discharge: HOME | End: 2024-08-08
Payer: COMMERCIAL

## 2024-08-08 VITALS
OXYGEN SATURATION: 98 % | TEMPERATURE: 98 F | DIASTOLIC BLOOD PRESSURE: 86 MMHG | RESPIRATION RATE: 16 BRPM | BODY MASS INDEX: 27 KG/M2 | HEIGHT: 67 IN | SYSTOLIC BLOOD PRESSURE: 123 MMHG | HEART RATE: 92 BPM | WEIGHT: 172 LBS

## 2024-08-08 DIAGNOSIS — S39.92XA BACK INJURY: ICD-10-CM

## 2024-08-08 DIAGNOSIS — S30.0XXA COCCYX CONTUSION, INITIAL ENCOUNTER: ICD-10-CM

## 2024-08-08 DIAGNOSIS — S69.90XA WRIST INJURY: Primary | ICD-10-CM

## 2024-08-08 PROCEDURE — 72220 X-RAY EXAM SACRUM TAILBONE: CPT | Performed by: NURSE PRACTITIONER

## 2024-08-08 PROCEDURE — 73110 X-RAY EXAM OF WRIST: CPT | Performed by: NURSE PRACTITIONER

## 2024-08-08 PROCEDURE — 99213 OFFICE O/P EST LOW 20 MIN: CPT | Performed by: NURSE PRACTITIONER

## 2024-08-08 NOTE — ED PROVIDER NOTES
Patient Seen in: [facility]    History     Chief Complaint   Patient presents with    Sprain, Minor     Entered by patient     Stated Complaint: Sprain, Minor    HPI    HPI: Diane Major is a 59 year old female who presents after an injury to the left wrist and tailbone   that occurred yesterday after a mechanical fall.  Her foot got stuck in her pants and she fell, landing on her coccyx and L wrist.  . Patient complains 4/10 pain.  Pain better with rest, worse with movement.  no loss of strengths or sensation    Past Medical History:    DDD (degenerative disc disease), cervical    Diverticulitis    s/p 14 inches resection    Esophageal reflux    Floaters    Hearing impairment    s/p stapedectomy, Dr. Banks.  very Unga left ear    IFG (impaired fasting glucose)    Menopause    Mitral valve prolapse    Old torn meniscus of knee    R, saw Dr. Lombardi PONV (postoperative nausea and vomiting)    Prediabetes    Visual impairment    glasses/contacts    Vitamin D insufficiency       Past Surgical History:   Procedure Laterality Date    Bowel resection  12/27/2022    Dr. Banks 2/2 diverticulitis 14 inches    Cholecystectomy      Colonoscopy  07/07/2017    repeat 10 yrs, Dr. Everardo Olsen (gen surg)    Colonoscopy  2017    Ear and throat examination  2009    stepedectomy (inner ear surgery), Dr. Banks    Hernia surgery      umbilical with mesh the 2nd time, Dr. Faust out of [facility]    Other surgical history  2022    colorectal surgery    Removal gallbladder      Repair ing hernia,7+y/o,reducibl              Family History   Problem Relation Age of Onset    Breast Cancer Mother 55        L mastectomy    High Blood Pressure Mother     Diabetes Mother     COPD Mother         never smoked, but owned a restraunt    Other (a fib) Mother     Cancer Mother     Heart Disorder Mother         AFIB    Pulmonary Disease Mother         COPD    Cancer Father         esophegeal, brain    Breast Cancer Sister  45    Other (multiple sclerosis) Sister 40    No Known Problems Maternal Grandmother     Heart Attack Maternal Grandfather     No Known Problems Paternal Grandmother     Heart Attack Paternal Grandfather        Social History     Socioeconomic History    Marital status:    Tobacco Use    Smoking status: Never    Smokeless tobacco: Never   Vaping Use    Vaping status: Never Used   Substance and Sexual Activity    Alcohol use: Yes     Alcohol/week: 4.0 standard drinks of alcohol     Types: 4 Glasses of wine per week     Comment: red [id number] 4 nights/wk, never a problem    Drug use: No    Sexual activity: Yes     Partners: Male     Birth control/protection: Vasectomy   Other Topics Concern    Caffeine Concern No    Exercise No    Seat Belt No    Special Diet No    Stress Concern No    Weight Concern No       Review of Systems    Positive for stated complaint: Sprain, Minor  Other systems are as noted in HPI.  Constitutional and vital signs reviewed.      All other systems reviewed and negative except as noted above.    PSFH elements reviewed from today and agreed except as otherwise stated in HPI.    Physical Exam     ED Triage Vitals [08/08/24 1739]   /86   Pulse 92   Resp 16   Temp 98 °F (36.7 °C)   Temp src Temporal   SpO2 98 %   O2 Device None (Room air)       Current:/86   Pulse 92   Temp 98 °F (36.7 °C) (Temporal)   Resp 16   Ht 170.2 cm (5' 7\")   Wt 78 kg   LMP  (LMP Unknown)   SpO2 98%   BMI 26.94 kg/m²         Physical Exam      MENTAL STATUS: Alert, oriented, and cooperative. No focal deficit  HEAD: Atraumatic  NECK: Supple, full range of motion without pain or paresthesias  EXTREMITIES: there is mild swelling to the pad at the base of the tumb on the l hand.  No snuff box tenderness.    NEURO:Sensation to touch is intact.  SKIN: No open wounds, no rashes.  PSYCH: Normal affect. Calm and cooperative.    Differential diagnosis to include fracture vs. Strain/sprain vs.  contusion    ED Course   Labs Reviewed - No data to display  I have personally  reviewed available prior medical records for any recent pertinent discharge summaries/testing. Patient/family updated on results and plan, a verbalized understanding and agreement with the plan.  I explained to the patient that emergent conditions may arise and to go to the ER for new, worsening or any persistent conditions. I've explained the importance of taking all medicatons as prescribed, follow up, and return precuations,  All questions answered.    Please note that this report has been produced using speech recognition software and may contain errors related to that system including, but not limited to, errors in grammar, punctuation, and spelling, as well as words and phrases that possibly may have been recognized inappropriately.  If there are any questions or concerns, contact the dictating provider for clarification.  [facility]     Radiology result:    XR WRIST COMPLETE (MIN 3 VIEWS), LEFT (CPT=73110)    Result Date: 8/8/2024  CONCLUSION:  No acute fractures.   LOCATION:  [facility]   Dictated by (CST): Colin Cherry MD on 8/08/2024 at 6:30 PM     Finalized by (CST): Colin Cherry MD on 8/08/2024 at 6:30 PM       XR SACRUM + COCCYX (MIN 2 VIEWS) (CPT=72220)    Result Date: 8/8/2024  CONCLUSION:  No acute findings.   LOCATION:  [facility]   Dictated by (CST): Colin Cherry MD on 8/08/2024 at 6:29 PM     Finalized by (CST): Colin Cherry MD on 8/08/2024 at 6:30 PM        Patient presents for injury after a fall.  This was a mechanical fall, no syncope or head injury.  No shortness of breath or chest pain and the patient has oxygen saturation which is within normal limits for this patient.  X-rays were performed which did not reveal any acute fracture or dislocation.  Presentation is clinically consistent with contusion. Instructions given on Rice therapy and over-the-counter medications for pain management. Recommend close  follow-up with PCP.  Discussed possibility of missed occult fracture and need for repeat imaging if pain is persistent after 2 weeks.  Return to ED precautions discussed with the patient.    Disposition and Plan     Clinical Impression:  1. Wrist injury    2. Back injury    3. Coccyx contusion, initial encounter        Disposition:  Discharge    Follow-up:  Debbie Luna MD  [address] [place] [id number]  [phone]            Medications Prescribed:  Current Discharge Medication List

## 2024-08-13 ENCOUNTER — OFFICE VISIT (OUTPATIENT)
Dept: FAMILY MEDICINE CLINIC | Facility: CLINIC | Age: 59
End: 2024-08-13
Payer: COMMERCIAL

## 2024-08-13 VITALS
HEART RATE: 68 BPM | DIASTOLIC BLOOD PRESSURE: 74 MMHG | HEIGHT: 67 IN | RESPIRATION RATE: 16 BRPM | WEIGHT: 202 LBS | SYSTOLIC BLOOD PRESSURE: 124 MMHG | BODY MASS INDEX: 31.71 KG/M2 | TEMPERATURE: 98 F | OXYGEN SATURATION: 98 %

## 2024-08-13 DIAGNOSIS — Z87.898 HISTORY OF PREDIABETES: Primary | ICD-10-CM

## 2024-08-13 DIAGNOSIS — E66.09 CLASS 1 OBESITY DUE TO EXCESS CALORIES WITHOUT SERIOUS COMORBIDITY WITH BODY MASS INDEX (BMI) OF 31.0 TO 31.9 IN ADULT: ICD-10-CM
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
## 2024-08-13 DIAGNOSIS — Z00.00 LABORATORY EXAM ORDERED AS PART OF ROUTINE GENERAL MEDICAL EXAMINATION: ICD-10-CM
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
## 2024-08-13 DIAGNOSIS — S60.212D: ICD-10-CM

## 2024-08-13 DIAGNOSIS — E55.9 VITAMIN D INSUFFICIENCY: ICD-10-CM

## 2024-08-13 PROCEDURE — 3078F DIAST BP <80 MM HG: CPT | Performed by: FAMILY MEDICINE

## 2024-08-13 PROCEDURE — 3074F SYST BP LT 130 MM HG: CPT | Performed by: FAMILY MEDICINE

## 2024-08-13 PROCEDURE — 3008F BODY MASS INDEX DOCD: CPT | Performed by: FAMILY MEDICINE

## 2024-08-13 PROCEDURE — 99214 OFFICE O/P EST MOD 30 MIN: CPT | Performed by: FAMILY MEDICINE

## 2024-08-13 NOTE — PROGRESS NOTES
[facility] Visit Note  8/13/2024      Subjective:      Patient ID: Diane Major is a 59 year old female.    Chief Complaint:  Chief Complaint   Patient presents with    Pre-diabetes     6 month f/u, had lab done on 8/6/24.    Fall       HPI:  Diane Major is a 59 year old female who is being seen today for the above.      IFG-  A1c 5.7 on 2/1/24 and now 5.5. on 8/6/24.  Rode her bike 35-45 min and 3 mo ago incorporated her 5# weights.  Lost 10#. Max was 212# IBW- 137#, BMI<25 wt would be 155# or less.  Weighs herself 1/wk on Fridays.   AM- fruit/yogurt  Lunch- salad  Dinner- fish, chicken, veggies      Fall-  Her friend passed and had to give a speech so didn't eat much. Then had 3 glasses of wine. Went home and when taking of her spanx she foot got caught and fell. Went to the urgent care on 8/8/24 says her tailbone feels fine now. Still has a bruise of the L wrist and mild tenderness but overall, ok.    X-ray of the L wrist  and sacrum/coccyx were both normal          Review of Systems - as stated above in the HPI      Objective:     Vitals:    08/13/24 0816   BP: 124/74   Pulse: 68   Resp: 16   Temp: 97.9 °F (36.6 °C)   TempSrc: Temporal   SpO2: 98%   Weight: 202 lb (91.6 kg)   Height: 5' 7\" (1.702 m)       Physical Examination   General:  Alert, in no acute distress  HEENT: NCAT, EOMI, normal TMs, oropharynx, and nasal turbinates.  Neck:  No cervical lymphadenopathy  CV: Regular rate and rhythm. No murmurs, gallops, or rubs.  Lungs:  Clear to auscultation B, no wheezes, rales, or rhonchi, normal respiratory effort  Abd:  +bowel sounds, soft  Ext:  No pedal edema,  Pedal pulses 2+ B  MS: L wrist and distal forearm with tan/yellow ecchymosis of the volar surface ~5 x 3cm, mild tenderness of the radial side of wrist.      Assessment:     1. History of prediabetes  - Hemoglobin A1C; Future    2. Class 1 obesity due to excess calories without serious comorbidity with body mass index (BMI) of  31.0 to 31.9 in adult  - Lipid Panel; Future    3. Vitamin D insufficiency  - Vitamin D; Future    4. Laboratory exam ordered as part of routine general medical examination  - CBC With Differential With Platelet; Future  - Comp Metabolic Panel (14); Future  - Lipid Panel; Future    5. Fall at home, subsequent encounter    6. Traumatic ecchymosis of left wrist, subsequent encounter  -time, ice, ibuprofen prn. RTC if not fully resolving      Return for annual physical in Jan/Feb.

## 2024-10-08 ENCOUNTER — ANESTHESIA (OUTPATIENT)
Dept: ENDOSCOPY | Facility: HOSPITAL | Age: 59
End: 2024-10-08
Payer: COMMERCIAL

## 2024-10-08 ENCOUNTER — HOSPITAL ENCOUNTER (OUTPATIENT)
Facility: HOSPITAL | Age: 59
Setting detail: HOSPITAL OUTPATIENT SURGERY
Discharge: HOME | End: 2024-10-08
Attending: STUDENT IN AN ORGANIZED HEALTH CARE EDUCATION/TRAINING PROGRAM | Admitting: STUDENT IN AN ORGANIZED HEALTH CARE EDUCATION/TRAINING PROGRAM
Payer: COMMERCIAL

## 2024-10-08 ENCOUNTER — ANESTHESIA EVENT (OUTPATIENT)
Dept: ENDOSCOPY | Facility: HOSPITAL | Age: 59
End: 2024-10-08
Payer: COMMERCIAL

## 2024-10-08 VITALS
RESPIRATION RATE: 16 BRPM | DIASTOLIC BLOOD PRESSURE: 65 MMHG | HEIGHT: 67 IN | WEIGHT: 185 LBS | TEMPERATURE: 98 F | SYSTOLIC BLOOD PRESSURE: 107 MMHG | OXYGEN SATURATION: 98 % | BODY MASS INDEX: 29.03 KG/M2 | HEART RATE: 67 BPM

## 2024-10-08 DIAGNOSIS — K62.5 RECTAL BLEEDING: ICD-10-CM

## 2024-10-08 PROBLEM — K57.90 DIVERTICULOSIS: Status: ACTIVE | Noted: 2017-04-28

## 2024-10-08 PROCEDURE — 0DJD8ZZ INSPECTION OF LOWER INTESTINAL TRACT, VIA NATURAL OR ARTIFICIAL OPENING ENDOSCOPIC: ICD-10-PCS | Performed by: STUDENT IN AN ORGANIZED HEALTH CARE EDUCATION/TRAINING PROGRAM

## 2024-10-08 PROCEDURE — 45378 DIAGNOSTIC COLONOSCOPY: CPT | Performed by: STUDENT IN AN ORGANIZED HEALTH CARE EDUCATION/TRAINING PROGRAM

## 2024-10-08 RX ORDER — SODIUM CHLORIDE, SODIUM LACTATE, POTASSIUM CHLORIDE, CALCIUM CHLORIDE 600; 310; 30; 20 MG/100ML; MG/100ML; MG/100ML; MG/100ML
INJECTION, SOLUTION INTRAVENOUS CONTINUOUS
Status: DISCONTINUED | OUTPATIENT
Start: 2024-10-08 | End: 2024-10-08

## 2024-10-08 RX ORDER — LIDOCAINE HYDROCHLORIDE 10 MG/ML
INJECTION, SOLUTION EPIDURAL; INFILTRATION; INTRACAUDAL; PERINEURAL AS NEEDED
Status: DISCONTINUED | OUTPATIENT
Start: 2024-10-08 | End: 2024-10-08 | Stop reason: SURG

## 2024-10-08 RX ADMIN — LIDOCAINE HYDROCHLORIDE 50 MG: 10 INJECTION, SOLUTION EPIDURAL; INFILTRATION; INTRACAUDAL; PERINEURAL at 08:22:00

## 2024-10-08 NOTE — ANESTHESIA POSTPROCEDURE EVALUATION
[facility]    Diane Antonioalex Patient Status:  Hospital Outpatient Surgery   Age/Gender 59 year old female MRN [id number]   Location [facility] Attending Ronaldo Lewis MD   Hosp Day # 0 PCP Debbie Luna MD       Anesthesia Post-op Note    COLONOSCOPY    Procedure Summary       Date: 10/08/24 Room / Location:  ENDOSCOPY 04 /  ENDOSCOPY    Anesthesia Start: 0819 Anesthesia Stop: 0851    Procedure: COLONOSCOPY Diagnosis:       Rectal bleeding      (DIVERTICULOSIS, ANAL SKIN TAGS)    Surgeons: Ronaldo Lewis MD Anesthesiologist: Wally Humphries MD    Anesthesia Type: MAC ASA Status: 2            Anesthesia Type: MAC    Vitals Value Taken Time   /67 10/08/24 0853   Temp 98 10/08/24 0853   Pulse 82 10/08/24 0853   Resp 16 10/08/24 0850   SpO2 97 % 10/08/24 0853   Vitals shown include unfiled device data.    Patient Location: Endoscopy    Anesthesia Type: MAC    Airway Patency: patent and extubated    Postop Pain Control: adequate    Mental Status: mildly sedated but able to meaningfully participate in the post-anesthesia evaluation    Nausea/Vomiting: none    Cardiopulmonary/Hydration status: stable euvolemic    Complications: no apparent anesthesia related complications    Postop vital signs: stable    Dental Exam: Unchanged from Preop    Patient to be discharged from PACU when criteria met.

## 2024-10-08 NOTE — DISCHARGE INSTRUCTIONS
Home Care Instructions for Colonoscopy with Sedation    Diet:  - Resume your regular diet   - Start with light meals to minimize bloating.  - Do not drink alcohol today.    Medication:  - If you have questions about resuming your normal medications, please contact your Primary Care Physician.    Activities:  - Take it easy today. Do not return to work today.  - Do not drive today.  - Do not operate any machinery today (including kitchen equipment).    Colonoscopy:  - You may notice some rectal \"spotting\" (a little blood on the toilet tissue) for a day or two after the exam. This is normal.  - If you experience any rectal bleeding (not spotting), persistent tenderness or sharp severe abdominal pains, oral temperature over 100 degrees Fahrenheit, light-headedness or dizziness, or any other problems, contact your doctor.    **If unable to reach your doctor, please go to the [facility] Emergency Room**    - Your referring physician will receive a full report of your examination.  - If you do not hear from your doctor's office within two weeks of your biopsy, please call them for your results.

## 2024-10-08 NOTE — OPERATIVE REPORT
[facility]  Operative Note    Diane Major Location: OR   [facility] [id number] MRN [id number]    3/27/1965 Age 59 year old   Admission Date 10/8/2024 Operation Date 10/8/2024   Attending Physician No att. providers found Operating Physician Ronaldo Lewis MD   PCP Debbie Luna MD          Patient Name: Diane Major    Preoperative Diagnosis: Rectal bleeding [K62.5]    Postoperative Diagnosis:   Diverticulosis  Anal skin tag    Primary Surgeon: Ronaldo Lewis MD    Anesthesia: MAC    Procedures: Colonoscopy to the cecum    Specimen:   None    Estimated Blood Loss: None    Complications: None immediate    Condition: Good    Indications for Surgery:   This is a very nice 59-year-old female previously known to Dr. Patel and Dr. Banks.  Patient has a history of recurrent diverticulitis.  She underwent a colonoscopy with Dr. Patel on 2017.  She was found to have diverticulosis and internal hemorrhoids and was given a 10-year recall.  Patient underwent robotic low anterior resection with Dr. Banks on 2022.  She did well after the surgery and last saw Dr. Banks on 2022.  She was told to follow-up as needed at that time.     I establish care with the patient on 2024 when she presented to my clinic with concern of seeing blood in her stool on 2 occasions.  She recalls being constipated with some straining when this bleeding happened.  She denies any rectal pain, prolapse, itching, seepage or incontinence.  She denies any persistent change in bowel habits, anemia or unintentional weight loss.  No family history of colon or rectal cancer.  No blood thinners.     External exam of the anus was normal.  Digital rectal exam revealed good tone without any palpable masses and no bleeding.  Anoscopy revealed small internal hemorrhoids.     I recommended planning for a colonoscopy to rule out any large polyps or malignancy in the setting of rectal bleeding.  The details of the colonoscopy  procedure were discussed including the prep instructions, risks, benefits and alternatives.  Patient expressed understanding and presents for the colonoscopy procedure today.  She has only had 1 episode of rectal bleeding from January until now.  She completed the bowel prep as instructed.  Consent was signed.  All questions answered.    Surgical Findings:   The quality of the bowel prep was excellent.  There were a few small to medium sized diverticula in the left colon proximal to the anastomosis.  There was a well-healed colorectal anastomosis visualized around 15 cm from the anal verge.  There was a small anterior midline anal skin tag.    Description of Procedure:   The patient was taken to the endoscopy suite and positioned in the left lateral decubitus position with knees flexed. Monitored anesthesia care was administered. A time-out was performed.    The perineum and perianal skin were examined. A digital rectal examination was performed.  This revealed a small anterior midline anal skin tag. A well-lubricated adult colonoscope was then inserted and carefully navigated to the cecum. CO2 insufflation was used throughout the procedure. Advancement was accomplished with ease. The appendiceal orifice and ileocecal valve were identified and photographed. The terminal ileum was intubated. The scope was then withdrawn as the mucosa was circumferentially examined.     There were a few small to medium sized diverticula in the left colon proximal to the anastomosis.  There was a well-healed colorectal anastomosis visualized around 15 cm from the anal verge. In the rectum, the scope was retroflexed to evaluate the anorectal junction.  The scope was straightened and excess gas was suctioned from the colon and the scope removed, terminating the procedure.    Anesthesia was terminated and the patient transported to the recovery unit in good condition. The patient tolerated the procedure well without apparent  intraoperative complication.    Follow up:   Patient will need next colonoscopy in 10 years.       Ronaldo Lewis MD  10/8/2024  10:10 AM

## 2024-10-08 NOTE — ANESTHESIA PREPROCEDURE EVALUATION
PRE-OP EVALUATION    Patient Name: Diane Major    Admit Diagnosis: Rectal bleeding [K62.5]    Pre-op Diagnosis: Rectal bleeding [K62.5]    COLONOSCOPY    Anesthesia Procedure: COLONOSCOPY    Surgeons and Role:     * Ronaldo Lewis MD - Primary    Pre-op vitals reviewed.  Temp: 98.2 °F (36.8 °C)  Pulse: 75  Resp: 16  BP: 135/73  SpO2: 96 %  Body mass index is 28.98 kg/m².    Current medications reviewed.  Hospital Medications:   lactated ringers infusion   Intravenous Continuous       Outpatient Medications:     Medications Prior to Admission   Medication Sig Dispense Refill Last Dose    ELDERBERRY OR Take 1 tablet by mouth daily.   10/6/2024    Multiple Vitamin (ONE-DAILY MULTI VITAMINS) Oral Tab Take 1 tablet by mouth daily.   10/6/2024    cholecalciferol 25 MCG (1000 UT) Oral Tab Take 1 tablet (1,000 Units total) by mouth daily.   10/6/2024    zinc sulfate 220 (50 Zn) MG Oral Cap Take 1 capsule (220 mg total) by mouth daily.   10/6/2024    Cyanocobalamin (VITAMIN B 12 OR) Take 1 tablet by mouth daily.   10/6/2024    Ascorbic Acid (VITAMIN C OR) Take 1 tablet by mouth daily.   10/6/2024       Allergies: Zosyn [piperacillin sod-tazobactam so], Codeine, and Adhesive tape      Anesthesia Evaluation    Patient summary reviewed.    Anesthetic Complications  (-) history of anesthetic complications         GI/Hepatic/Renal    Negative GI/hepatic/renal ROS.  (+) GERD                (+) diverticulitis           Cardiovascular    Negative cardiovascular ROS.    Exercise tolerance: good     MET: >4                   (+) valvular problems/murmurs and MVP                       Endo/Other    Negative endo/other ROS.                              Pulmonary    Negative pulmonary ROS.                       Neuro/Psych    Negative neuro/psych ROS.                                  Past Surgical History:   Procedure Laterality Date    Bowel resection  12/27/2022    Dr. Banks 2/2 diverticulitis 14 inches    Cholecystectomy       Colonoscopy  07/07/2017    repeat 10 yrs, Dr. Everardo Olsen (gen surg)    Colonoscopy  2017    Ear and throat examination  2009    stepedectomy (inner ear surgery), Dr. Banks    Hernia surgery      umbilical with mesh the 2nd time, Dr. Faust out of [facility]    Other surgical history  2022    colorectal surgery    Removal gallbladder      Repair ing hernia,7+y/o,reducibl       Social History     Socioeconomic History    Marital status:    Tobacco Use    Smoking status: Never    Smokeless tobacco: Never   Vaping Use    Vaping status: Never Used   Substance and Sexual Activity    Alcohol use: Yes     Alcohol/week: 4.0 standard drinks of alcohol     Types: 4 Glasses of wine per week     Comment: red [id number] 4 nights/wk, never a problem    Drug use: No    Sexual activity: Yes     Partners: Male     Birth control/protection: Vasectomy   Other Topics Concern    Caffeine Concern No    Exercise No    Seat Belt No    Special Diet No    Stress Concern No    Weight Concern No     History   Drug Use No     Available pre-op labs reviewed.               Airway      Mallampati: I  Mouth opening: 3 FB  TM distance: 4 - 6 cm  Neck ROM: full Cardiovascular    Cardiovascular exam normal.  Rhythm: regular  Rate: normal  (-) murmur   Dental             Pulmonary    Pulmonary exam normal.  Breath sounds clear to auscultation bilaterally.               Other findings              ASA: 2   Plan: MAC  NPO status verified and patient meets guidelines.    Post-procedure pain management plan discussed with surgeon and patient.    Comment: Discussed MAC anesthesia with patient.  Discussed risks including but not limited to perioperative awareness, conversion to general anesthesia and risks associated with GA.  PT understands and agrees to proceed.    Plan/risks discussed with: patient                Present on Admission:  **None**

## 2024-10-08 NOTE — H&P
New Patient Visit Note       Active Problems      1. Rectal bleeding        Chief Complaint   No chief complaint on file.      History of Present Illness   This is a very nice 59-year-old female previously known to Dr. Patel and Dr. Banks.  Patient has a history of recurrent diverticulitis.  She underwent a colonoscopy with Dr. Patel on 7/7/2017.  She was found to have diverticulosis and internal hemorrhoids and was given a 10-year recall.  Patient underwent robotic low anterior resection with Dr. Banks on 12/19/2022.  She did well after the surgery and last saw Dr. Banks on 12/27/2022.  She was told to follow-up as needed at that time.    Patient presents to me because she has seen blood in her stool on 2 occasions this month.  She recalls being constipated with some straining when this bleeding happened.  She denies any rectal pain, prolapse, itching, seepage or incontinence.  She denies any persistent change in bowel habits, anemia or unintentional weight loss.  No family history of colon or rectal cancer.  No blood thinners.    Allergies  Diane is allergic to zosyn [piperacillin sod-tazobactam so], codeine, and adhesive tape.    Past Medical / Surgical / Social / Family History    The past medical and past surgical history have been reviewed by me today.    Past Medical History:    DDD (degenerative disc disease), cervical    Diverticulitis    s/p 14 inches resection    Esophageal reflux    Floaters    Hearing impairment    s/p stapedectomy, Dr. Banks.  very [place] left ear    IFG (impaired fasting glucose)    Menopause    Mitral valve prolapse    Old torn meniscus of knee    R, saw Dr. Lombardi PONV (postoperative nausea and vomiting)    Prediabetes    Visual impairment    glasses/contacts    Vitamin D insufficiency     Past Surgical History:   Procedure Laterality Date    Bowel resection  12/27/2022    Dr. Banks 2/2 diverticulitis 14 inches    Cholecystectomy      Colonoscopy  07/07/2017    repeat 10  yrs, Dr. Everardo Olsen (gen surg)    Colonoscopy  2017    Ear and throat examination  2009    stepedectomy (inner ear surgery), Dr. Banks    Hernia surgery      umbilical with mesh the 2nd time, Dr. Faust out of [facility]    Other surgical history  2022    colorectal surgery    Removal gallbladder      Repair ing hernia,5+y/o,reducibl         The family history and social history have been reviewed by me today.    Family History   Problem Relation Age of Onset    Breast Cancer Mother 55        L mastectomy    High Blood Pressure Mother     Diabetes Mother     COPD Mother         never smoked, but owned a restraunt    Other (a fib) Mother     Cancer Mother     Heart Disorder Mother         AFIB    Pulmonary Disease Mother         COPD    Cancer Father         esophegeal, brain    Breast Cancer Sister 45    Other (multiple sclerosis) Sister 40    No Known Problems Maternal Grandmother     Heart Attack Maternal Grandfather     No Known Problems Paternal Grandmother     Heart Attack Paternal Grandfather      Social History     Socioeconomic History    Marital status:    Tobacco Use    Smoking status: Never    Smokeless tobacco: Never   Vaping Use    Vaping status: Never Used   Substance and Sexual Activity    Alcohol use: Yes     Alcohol/week: 4.0 standard drinks of alcohol     Types: 4 Glasses of wine per week     Comment: red [id number] 4 nights/wk, never a problem    Drug use: No    Sexual activity: Yes     Partners: Male     Birth control/protection: Vasectomy   Other Topics Concern    Caffeine Concern No    Exercise No    Seat Belt No    Special Diet No    Stress Concern No    Weight Concern No      No current outpatient medications on file.      Review of Systems  A 10 point review of systems was performed and negative unless otherwise documented per HPI.    Physical Findings   /73 (BP Location: Left arm)   Pulse 75   Temp 98.2 °F (36.8 °C) (Temporal)   Resp 16   Ht 67\"   Wt 185 lb (83.9  kg)   LMP  (LMP Unknown)   SpO2 96%   BMI 28.98 kg/m²   Physical Exam  Vitals and nursing note reviewed. Exam conducted with a chaperone present.   Constitutional:       General: She is not in acute distress.  HENT:      Head: Normocephalic and atraumatic.      Mouth/Throat:      Mouth: Mucous membranes are moist.   Cardiovascular:      Rate and Rhythm: Normal rate and regular rhythm.   Pulmonary:      Effort: Pulmonary effort is normal.   Abdominal:      General: There is no distension.      Palpations: Abdomen is soft.      Tenderness: There is no abdominal tenderness.   Genitourinary:     Comments: Patient examined in the prone jackknife position with female medical assistant chaperone present.  External exam of the anus is normal.  Digital rectal exam reveals good tone without any palpable masses and no bleeding.  Anoscopy reveals small internal hemorrhoids.  Musculoskeletal:         General: No deformity.   Skin:     General: Skin is warm and dry.   Neurological:      General: No focal deficit present.      Mental Status: She is alert.   Psychiatric:         Mood and Affect: Mood normal.             Assessment   1. Rectal bleeding        This is a very nice 58-year-old female previously known to Dr. Patel and Dr. Banks.  Patient has a history of recurrent diverticulitis.  She underwent a colonoscopy with Dr. Patel on 7/7/2017.  She was found to have diverticulosis and internal hemorrhoids and was given a 10-year recall.  Patient underwent robotic low anterior resection with Dr. Banks on 12/19/2022.  She did well after the surgery and last saw Dr. Banks on 12/27/2022.  She was told to follow-up as needed at that time.    Patient presents to me because she has seen blood in her stool on 2 occasions this month.  She recalls being constipated with some straining when this bleeding happened.  She denies any rectal pain, prolapse, itching, seepage or incontinence.  She denies any persistent change in bowel  habits, anemia or unintentional weight loss.  No family history of colon or rectal cancer.  No blood thinners.    External exam of the anus is normal.  Digital rectal exam reveals good tone without any palpable masses and no bleeding.  Anoscopy reveals small internal hemorrhoids.    Plan  I recommend planning for a colonoscopy to rule out any large polyps or malignancy in the setting of rectal bleeding.  The details of the colonoscopy procedure were discussed including the prep instructions, risks, benefits and alternatives.  Patient expressed understanding and presents for the colonoscopy procedure today.     No orders of the defined types were placed in this encounter.      Imaging & Referrals   VITAL SIGNS  PLACE PIV  ACTIVITY AS TOLERATED  INITIATE ALGORITHM FOR HYPOGLYCEMIA FOR ADULTS (NON-PREG)  NOTIFY PHYSICIAN (SPECIFY)  NOTIFY PHYSICIAN (SPECIFY)  NOTIFY PHYSICIAN (SPECIFY)  VERIFY INFORMED CONSENT  NPO  VITAL SIGNS - NOTIFY PHYSICIAN  VITAL SIGNS  NOTIFY PHYSICIAN (SPECIFY)  DISCHARGE WHEN CRITERIA MET  DISCONTINUE PIV    Follow Up  No follow-ups on file.    Ronaldo Lewis MD

## 2024-10-09 ENCOUNTER — PATIENT MESSAGE (OUTPATIENT)
Dept: FAMILY MEDICINE CLINIC | Facility: CLINIC | Age: 59
End: 2024-10-09

## 2025-01-14 ENCOUNTER — OFFICE VISIT (OUTPATIENT)
Facility: LOCATION | Age: 60
End: 2025-01-14
Payer: COMMERCIAL

## 2025-01-14 DIAGNOSIS — R04.0 EPISTAXIS: Primary | ICD-10-CM

## 2025-01-14 DIAGNOSIS — J34.2 DEVIATED NASAL SEPTUM: ICD-10-CM

## 2025-01-14 PROCEDURE — 99204 OFFICE O/P NEW MOD 45 MIN: CPT | Performed by: OTOLARYNGOLOGY

## 2025-01-14 PROCEDURE — 30901 CONTROL OF NOSEBLEED: CPT | Performed by: OTOLARYNGOLOGY

## 2025-01-14 NOTE — PROGRESS NOTES
Diane Major is a 59 year old female. No chief complaint on file.    HPI:   59-year-old white female with history of left-sided epistaxis started in June was intermittent since the winter has been almost daily last episode Sunday which is 2 days ago.  Denies nasal trauma history.  Does not take blood thinners  Current Outpatient Medications   Medication Sig Dispense Refill    ELDERBERRY OR Take 1 tablet by mouth daily.      Multiple Vitamin (ONE-DAILY MULTI VITAMINS) Oral Tab Take 1 tablet by mouth daily.      cholecalciferol 25 MCG (1000 UT) Oral Tab Take 1 tablet (1,000 Units total) by mouth daily.      zinc sulfate 220 (50 Zn) MG Oral Cap Take 1 capsule (220 mg total) by mouth daily.      Cyanocobalamin (VITAMIN B 12 OR) Take 1 tablet by mouth daily.      Ascorbic Acid (VITAMIN C OR) Take 1 tablet by mouth daily.        Past Medical History:    DDD (degenerative disc disease), cervical    Diverticulitis    s/p 14 inches resection    Esophageal reflux    Floaters    Hearing impairment    s/p stapedectomy, Dr. Banks.  very [place] left ear    IFG (impaired fasting glucose)    Menopause    Mitral valve prolapse    Old torn meniscus of knee    R, saw Dr. Lombardi PONV (postoperative nausea and vomiting)    Prediabetes    Visual impairment    glasses/contacts    Vitamin D insufficiency      Social History:  Social History     Socioeconomic History    Marital status:    Tobacco Use    Smoking status: Never    Smokeless tobacco: Never   Vaping Use    Vaping status: Never Used   Substance and Sexual Activity    Alcohol use: Yes     Alcohol/week: 4.0 standard drinks of alcohol     Types: 4 Glasses of wine per week     Comment: red [id number] 4 nights/wk, never a problem    Drug use: No    Sexual activity: Yes     Partners: Male     Birth control/protection: Vasectomy   Other Topics Concern    Caffeine Concern No    Exercise No    Seat Belt No    Special Diet No    Stress Concern No    Weight Concern No       Past Surgical History:   Procedure Laterality Date    Bowel resection  12/27/2022    Dr. Banks 2/2 diverticulitis 14 inches    Cholecystectomy      Colonoscopy  07/07/2017    repeat 10 yrs, Dr. Everardo Olsen (gen surg)    Colonoscopy  2017    Colonoscopy N/A 10/8/2024    Procedure: COLONOSCOPY;  Surgeon: Ronaldo Lewis MD;  Location:  ENDOSCOPY    Ear and throat examination  2009    stepedectomy (inner ear surgery), Dr. Banks    Hernia surgery      umbilical with mesh the 2nd time, Dr. Faust out of [facility]    Other surgical history  2022    colorectal surgery    Removal gallbladder      Repair ing hernia,5+y/o,reducibl           REVIEW OF SYSTEMS:   GENERAL HEALTH: feels well otherwise  GENERAL : denies fever, chills, sweats, weight loss, weight gain  SKIN: denies any unusual skin lesions or rashes  RESPIRATORY: denies shortness of breath with exertion  NEURO: denies headaches    EXAM:   LMP  (LMP Unknown)     System Pertinent findings Details   Constitutional  Overall appearance - Normal.   Psychiatric  Orientation - Oriented to time, place, person & situation. Appropriate mood and affect.   Head/Face  Facial features -- Normal. Skull - Normal.   Eyes  Pupils equal ,round ,react to light and accomidate   Ears  External Ear Right: Normal, Left: Normal. Canal - Right: Normal, Left: Normal. TM - Right: Normal left: Normal   Nose  External Nose, Normal, Septum -deviated septum to the left anterior area of the septum looks suspicious for the nosebleed nasal Vault, clear,Turbinates - Right: Normal left: Normal   Mouth/Throat  Lips/teeth/gums - Normal. Tonsils -1+. Oropharynx - Normal.   Neck Exam  Inspection - Normal. Palpation - Normal. Parotid gland - Normal. Thyroid gland -normal   Neurological  Cranial nerves - Cranial nerves II through XII grossly intact.   Nasopharynx   Normal        Lymph Detail  Submental. Submandibular. Anterior cervical. Posterior cervical. Supraclavicular.   Patient in for  epistaxis requiring cauterization.     Procedure: Patient was topically anesthetized in the usual fashion.  Using silver nitrate sticks the left anterior septum septum was cauterized in the usual fashion.  Patient tolerated the procedure well.  Given routine post cauterization instructions.  And will follow-up as needed.    ASSESSMENT AND PLAN:   1. Epistaxis  Left anterior septum cauterized  Routine instructions  AYR saline gel    2. Deviated nasal septum  No recommendations at this time      The patient indicates understanding of these issues and agrees to the plan.      Luis A Banks MD  1/14/2025  12:02 PM

## 2025-02-18 ENCOUNTER — OFFICE VISIT (OUTPATIENT)
Dept: FAMILY MEDICINE CLINIC | Facility: CLINIC | Age: 60
End: 2025-02-18
Payer: COMMERCIAL

## 2025-02-18 ENCOUNTER — LAB ENCOUNTER (OUTPATIENT)
Dept: LAB | Age: 60
End: 2025-02-18
Attending: FAMILY MEDICINE
Payer: COMMERCIAL

## 2025-02-18 VITALS
OXYGEN SATURATION: 99 % | HEART RATE: 88 BPM | BODY MASS INDEX: 32.33 KG/M2 | HEIGHT: 67 IN | DIASTOLIC BLOOD PRESSURE: 70 MMHG | TEMPERATURE: 98 F | WEIGHT: 206 LBS | RESPIRATION RATE: 16 BRPM | SYSTOLIC BLOOD PRESSURE: 112 MMHG

## 2025-02-18 DIAGNOSIS — Z23 NEED FOR VACCINATION: ICD-10-CM

## 2025-02-18 DIAGNOSIS — E66.811 CLASS 1 OBESITY DUE TO EXCESS CALORIES WITHOUT SERIOUS COMORBIDITY WITH BODY MASS INDEX (BMI) OF 32.0 TO 32.9 IN ADULT: ICD-10-CM

## 2025-02-18 DIAGNOSIS — E66.09 CLASS 1 OBESITY DUE TO EXCESS CALORIES WITHOUT SERIOUS COMORBIDITY WITH BODY MASS INDEX (BMI) OF 32.0 TO 32.9 IN ADULT: ICD-10-CM

## 2025-02-18 DIAGNOSIS — Z87.898 HISTORY OF PREDIABETES: ICD-10-CM

## 2025-02-18 DIAGNOSIS — Z00.00 LABORATORY EXAM ORDERED AS PART OF ROUTINE GENERAL MEDICAL EXAMINATION: ICD-10-CM

## 2025-02-18 DIAGNOSIS — E66.811 CLASS 1 OBESITY DUE TO EXCESS CALORIES WITHOUT SERIOUS COMORBIDITY WITH BODY MASS INDEX (BMI) OF 31.0 TO 31.9 IN ADULT: ICD-10-CM

## 2025-02-18 DIAGNOSIS — E55.9 VITAMIN D INSUFFICIENCY: ICD-10-CM

## 2025-02-18 DIAGNOSIS — E66.09 CLASS 1 OBESITY DUE TO EXCESS CALORIES WITHOUT SERIOUS COMORBIDITY WITH BODY MASS INDEX (BMI) OF 31.0 TO 31.9 IN ADULT: ICD-10-CM

## 2025-02-18 DIAGNOSIS — Z00.00 ROUTINE MEDICAL EXAM: Primary | ICD-10-CM

## 2025-02-18 DIAGNOSIS — Z80.3 FAMILY HISTORY OF BREAST CANCER IN MOTHER: ICD-10-CM

## 2025-02-18 DIAGNOSIS — Z12.31 ENCOUNTER FOR SCREENING MAMMOGRAM FOR MALIGNANT NEOPLASM OF BREAST: ICD-10-CM

## 2025-02-18 PROBLEM — H43.811 PVD (POSTERIOR VITREOUS DETACHMENT), RIGHT EYE: Status: ACTIVE | Noted: 2025-02-18

## 2025-02-18 LAB
ALBUMIN SERPL-MCNC: 4.9 G/DL (ref 3.2–4.8)
ALBUMIN/GLOB SERPL: 1.5 {RATIO} (ref 1–2)
ALP LIVER SERPL-CCNC: 75 U/L
ALT SERPL-CCNC: 23 U/L
ANION GAP SERPL CALC-SCNC: 9 MMOL/L (ref 0–18)
AST SERPL-CCNC: 27 U/L (ref ?–34)
BASOPHILS # BLD AUTO: 0.05 X10(3) UL (ref 0–0.2)
BASOPHILS NFR BLD AUTO: 0.9 %
BILIRUB SERPL-MCNC: 0.6 MG/DL (ref 0.3–1.2)
BUN BLD-MCNC: 13 MG/DL (ref 9–23)
CALCIUM BLD-MCNC: 9.6 MG/DL (ref 8.7–10.6)
CHLORIDE SERPL-SCNC: 102 MMOL/L (ref 98–112)
CHOLEST SERPL-MCNC: 217 MG/DL (ref ?–200)
CO2 SERPL-SCNC: 27 MMOL/L (ref 21–32)
CREAT BLD-MCNC: 0.86 MG/DL
EGFRCR SERPLBLD CKD-EPI 2021: 79 ML/MIN/1.73M2 (ref 60–?)
EOSINOPHIL # BLD AUTO: 0.22 X10(3) UL (ref 0–0.7)
EOSINOPHIL NFR BLD AUTO: 4.2 %
ERYTHROCYTE [DISTWIDTH] IN BLOOD BY AUTOMATED COUNT: 13.5 %
EST. AVERAGE GLUCOSE BLD GHB EST-MCNC: 111 MG/DL (ref 68–126)
FASTING PATIENT LIPID ANSWER: YES
FASTING STATUS PATIENT QL REPORTED: YES
GLOBULIN PLAS-MCNC: 3.3 G/DL (ref 2–3.5)
GLUCOSE BLD-MCNC: 97 MG/DL (ref 70–99)
HBA1C MFR BLD: 5.5 % (ref ?–5.7)
HCT VFR BLD AUTO: 42.5 %
HDLC SERPL-MCNC: 78 MG/DL (ref 40–59)
HGB BLD-MCNC: 14.1 G/DL
IMM GRANULOCYTES # BLD AUTO: 0 X10(3) UL (ref 0–1)
IMM GRANULOCYTES NFR BLD: 0 %
LDLC SERPL CALC-MCNC: 122 MG/DL (ref ?–100)
LYMPHOCYTES # BLD AUTO: 1.55 X10(3) UL (ref 1–4)
LYMPHOCYTES NFR BLD AUTO: 29.3 %
MCH RBC QN AUTO: 29.7 PG (ref 26–34)
MCHC RBC AUTO-ENTMCNC: 33.2 G/DL (ref 31–37)
MCV RBC AUTO: 89.7 FL
MONOCYTES # BLD AUTO: 0.37 X10(3) UL (ref 0.1–1)
MONOCYTES NFR BLD AUTO: 7 %
NEUTROPHILS # BLD AUTO: 3.1 X10 (3) UL (ref 1.5–7.7)
NEUTROPHILS # BLD AUTO: 3.1 X10(3) UL (ref 1.5–7.7)
NEUTROPHILS NFR BLD AUTO: 58.6 %
NONHDLC SERPL-MCNC: 139 MG/DL (ref ?–130)
OSMOLALITY SERPL CALC.SUM OF ELEC: 286 MOSM/KG (ref 275–295)
PLATELET # BLD AUTO: 259 10(3)UL (ref 150–450)
POTASSIUM SERPL-SCNC: 4.4 MMOL/L (ref 3.5–5.1)
PROT SERPL-MCNC: 8.2 G/DL (ref 5.7–8.2)
RBC # BLD AUTO: 4.74 X10(6)UL
SODIUM SERPL-SCNC: 138 MMOL/L (ref 136–145)
TRIGL SERPL-MCNC: 97 MG/DL (ref 30–149)
TSI SER-ACNC: 1.38 UIU/ML (ref 0.55–4.78)
VIT D+METAB SERPL-MCNC: 67.8 NG/ML (ref 30–100)
VLDLC SERPL CALC-MCNC: 17 MG/DL (ref 0–30)
WBC # BLD AUTO: 5.3 X10(3) UL (ref 4–11)

## 2025-02-18 PROCEDURE — 3078F DIAST BP <80 MM HG: CPT | Performed by: FAMILY MEDICINE

## 2025-02-18 PROCEDURE — 3008F BODY MASS INDEX DOCD: CPT | Performed by: FAMILY MEDICINE

## 2025-02-18 PROCEDURE — 85025 COMPLETE CBC W/AUTO DIFF WBC: CPT

## 2025-02-18 PROCEDURE — 84443 ASSAY THYROID STIM HORMONE: CPT

## 2025-02-18 PROCEDURE — 82306 VITAMIN D 25 HYDROXY: CPT

## 2025-02-18 PROCEDURE — 36415 COLL VENOUS BLD VENIPUNCTURE: CPT

## 2025-02-18 PROCEDURE — 99396 PREV VISIT EST AGE 40-64: CPT | Performed by: FAMILY MEDICINE

## 2025-02-18 PROCEDURE — 80061 LIPID PANEL: CPT

## 2025-02-18 PROCEDURE — 83036 HEMOGLOBIN GLYCOSYLATED A1C: CPT

## 2025-02-18 PROCEDURE — 3074F SYST BP LT 130 MM HG: CPT | Performed by: FAMILY MEDICINE

## 2025-02-18 PROCEDURE — 80053 COMPREHEN METABOLIC PANEL: CPT

## 2025-02-18 NOTE — PROGRESS NOTES
Chief Complaint   Patient presents with    Physical     Reviewed Preventative/Wellness form with patient.         HPI:  Diane Major is a 59 year old female here today for preventative visit.      Imms- up to date with both pneum, and tdap. Discussed shingrix, covid, & flu. Found out her insurance does cover shingrix but at office only.        Cervical cancer screening- No h/o abnormal paps, HPV, or genital warts. Normal co-testing 1/25/24, done by myself. Repeat 5 yrs.        Breast cancer screening-  Both her mother and sister had breast cancer. Normal mammogram on 3/13/24. Asking if she needs further screening. Discussed potential for breast MRI q 6mo and to see breast clinic if interested.        Colon cancer screening- No family h/o colon cancer. C-scope done 10/8/24 with Dr. Lewis, repeat 10 yrs. Had a bowel resection 12/27/22 due to diverticulitis.        Osteoporosis screening- dexa 1/28/21 normal.        Diet/exercise- got an exercise bike, as an empty bettina so can eat better without kids at home. Riding the bike 4-5d/wk. No change in the scale.   Breakfast- yogurt, fruit, granola, coffee 2-3c  Lunch- salad, tons of veggies  Dinner- 4nights/wk fish or chicken, veggies.   Drinks water, 1 glass wine/night.  Hardly eats bread/potatoes except on the weekend.   Trying for 1yr now and has lost 8# only.   Doesn't want to be on meds.        Dental/Eye Check up-  Recommended pt see dentist once every 6 months for a cleaning and once every year for an eye exam.       Pre-DM and vit d defic- labs already in place and will do shortly.       Past Medical History:    DDD (degenerative disc disease), cervical    Esophageal reflux    Floaters    Hearing impairment    s/p stapedectomy, Dr. Banks.  very [place] left ear    History of diverticulitis    s/p 14 inches resection    Menopause    Mitral valve prolapse    Old torn meniscus of knee    R, saw Dr. Lombardi PONV (postoperative nausea and vomiting)     Prediabetes    Visual impairment    glasses/contacts    Vitamin D insufficiency     Past Surgical History:   Procedure Laterality Date    Bowel resection  12/27/2022    Dr. Banks 2/2 diverticulitis 14 inches    Cholecystectomy      Colonoscopy  07/07/2017    repeat 10 yrs, Dr. Everardo Olsen (gen surg)    Colonoscopy N/A 10/08/2024    repeat 10 yrs, Ronaldo Lewis MD;  Location:  ENDOSCOPY    Ear and throat examination  2009    stepedectomy (inner ear surgery), Dr. Banks    Hernia surgery      umbilical with mesh the 2nd time, Dr. Faust out of [place]    Removal gallbladder      Repair ing hernia,5+y/o,reducibl       Medications Ordered Prior to Encounter[1]  Allergies[2]  Social History     Socioeconomic History    Marital status:      Spouse name: Not on file    Number of children: Not on file    Years of education: Not on file    Highest education level: Not on file   Occupational History    Not on file   Tobacco Use    Smoking status: Never    Smokeless tobacco: Never   Vaping Use    Vaping status: Never Used   Substance and Sexual Activity    Alcohol use: Yes     Alcohol/week: 4.0 standard drinks of alcohol     Types: 4 Glasses of wine per week     Comment: red [id number] 4 nights/wk, never a problem    Drug use: No    Sexual activity: Yes     Partners: Male     Birth control/protection: Vasectomy   Other Topics Concern    Caffeine Concern No    Exercise No    Seat Belt No    Special Diet No    Stress Concern No    Weight Concern No   Social History Narrative    Not on file     Social Drivers of Health     Food Insecurity: No Food Insecurity (2/18/2025)    NCSS - Food Insecurity     Worried About Running Out of Food in the Last Year: No     Ran Out of Food in the Last Year: No   Transportation Needs: No Transportation Needs (2/18/2025)    NCSS - Transportation     Lack of Transportation: No   Stress: Not on file   Housing Stability: Not At Risk (2/18/2025)    NCSS - Housing/Utilities     Has  Housing: Yes     Worried About Losing Housing: No     Unable to Get Utilities: No     Family History   Problem Relation Age of Onset    Breast Cancer Mother 55        L mastectomy    High Blood Pressure Mother     Diabetes Mother     COPD Mother         never smoked, but owned a restraunt    Other (a fib) Mother     Cancer Mother     Heart Disorder Mother         AFIB    Pulmonary Disease Mother         COPD    Cancer Father         esophegeal, brain    Breast Cancer Sister 45    Other (multiple sclerosis) Sister 40    No Known Problems Maternal Grandmother     Heart Attack Maternal Grandfather     No Known Problems Paternal Grandmother     Heart Attack Paternal Grandfather        Review of Systems - All systems reviewed and negative except for HPI    PHYSICAL EXAM:  /70   Pulse 88   Temp 97.9 °F (36.6 °C) (Temporal)   Resp 16   Ht 5' 7\" (1.702 m)   Wt 206 lb (93.4 kg)   LMP  (LMP Unknown)   SpO2 99%   BMI 32.26 kg/m²   GENERAL APPEARANCE:  Alert, no acute distress, appears stated age  HEENT:  Head- Normocephalic, atraumatic.    Eyes- Extraocular movements intact, pupils equally round and reactive to light,  conjunctivae normal.    Ears- Tympanic membranes intact bilaterally.    Nose- Patent, normal septum and turbinates.    Mouth/Throat- Normal oral mucosa, throat non-erythematous.  NECK:  No submental, submandibular, ant/post cervical lymphadenopathy. No thyromegaly or masses.  PULMONARY:  Lungs clear to auscultation bilaterally. No wheezes, rales, or rhonchi. Normal respiratory effort.  CARDIOVASCULAR:  Regular rate and rhythm. No murmurs, gallops, or rubs.  ABDOMEN:  + bowel sounds, soft, nontender, nondistended. No hepatomegaly.  MUSCULOSKELETAL: Strength of upper and lower extremities 5/5 bilaterally. Normal gait.  NEUROLOGIC:  Cranial nerves 2-12 grossly intact.  PSYCHIATRIC:  Normal mood, affect, and hygiene.   Breasts: breast appear normal, no suspicious masses, no skin or nipple  changes/discharge. No supraclavicular or axillary nodes. Chaperone present Benji [employer] Student      ASSESSMENT/PLAN:    1. Routine medical exam    2. Encounter for screening mammogram for malignant neoplasm of breast  - [facility] LIEN 2D+3D SCREENING BILAT (CPT=77067/71013); Future  - High Risk Breast Clinic Referral - [facility]    3. Laboratory exam ordered as part of routine general medical examination    4. Need for vaccination  - Immunization Admin Counseling, 1st Component, 18 years and older  - Fluzone trivalent vaccine, PF 0.5mL, 6mo+ (25037)    5. Class 1 obesity due to excess calories without serious comorbidity with body mass index (BMI) of 32.0 to 32.9 in adult  - Assay, Thyroid Stim Hormone; Future    6. Family history of breast cancer in mother  - High Risk Breast Clinic Referral - [facility]        Patient verbalized understanding and agrees to plan.      Return in about 1 year (around 2/18/2026) for annual physical, we will contact you with results.         [1]   Current Outpatient Medications on File Prior to Visit   Medication Sig Dispense Refill    ELDERBERRY OR Take 1 tablet by mouth daily.      Multiple Vitamin (ONE-DAILY MULTI VITAMINS) Oral Tab Take 1 tablet by mouth daily.      cholecalciferol 25 MCG (1000 UT) Oral Tab Take 1 tablet (1,000 Units total) by mouth daily.      zinc sulfate 220 (50 Zn) MG Oral Cap Take 1 capsule (220 mg total) by mouth daily.      Cyanocobalamin (VITAMIN B 12 OR) Take 1 tablet by mouth daily.      Ascorbic Acid (VITAMIN C OR) Take 1 tablet by mouth daily.       No current facility-administered medications on file prior to visit.   [2]   Allergies  Allergen Reactions    Zosyn [Piperacillin Sod-Tazobactam So] HIVES and ITCHING    Codeine NAUSEA AND VOMITING    Adhesive Tape RASH     From electrode pads

## 2025-02-21 ENCOUNTER — ORDER TRANSCRIPTION (OUTPATIENT)
Dept: ADMINISTRATIVE | Facility: HOSPITAL | Age: 60
End: 2025-02-21

## 2025-02-21 DIAGNOSIS — Z13.6 SCREENING FOR CARDIOVASCULAR CONDITION: Primary | ICD-10-CM

## 2025-03-14 ENCOUNTER — HOSPITAL ENCOUNTER (OUTPATIENT)
Dept: MAMMOGRAPHY | Age: 60
Discharge: HOME | End: 2025-03-14
Attending: FAMILY MEDICINE
Payer: COMMERCIAL

## 2025-03-14 DIAGNOSIS — Z12.31 ENCOUNTER FOR SCREENING MAMMOGRAM FOR MALIGNANT NEOPLASM OF BREAST: ICD-10-CM

## 2025-03-14 PROCEDURE — 77067 SCR MAMMO BI INCL CAD: CPT | Performed by: FAMILY MEDICINE

## 2025-03-14 PROCEDURE — 77063 BREAST TOMOSYNTHESIS BI: CPT | Performed by: FAMILY MEDICINE

## 2025-03-15 NOTE — PROGRESS NOTES
Hello,     Your screening mammogram result was normal.     Take care,  Dr. Dooley (covering for Dr. Luna)

## 2025-04-07 ENCOUNTER — HOSPITAL ENCOUNTER (OUTPATIENT)
Dept: CT IMAGING | Facility: HOSPITAL | Age: 60
Discharge: HOME | End: 2025-04-07
Attending: FAMILY MEDICINE

## 2025-04-07 DIAGNOSIS — Z13.6 SCREENING FOR CARDIOVASCULAR CONDITION: ICD-10-CM

## (undated) DEVICE — PERMANENT CAUTERY HOOK: Brand: ENDOWRIST

## (undated) DEVICE — STAPLER 60: Brand: SUREFORM

## (undated) DEVICE — STAPLER 60 RELOAD BLUE: Brand: SUREFORM

## (undated) DEVICE — DRAIN CHANNEL 19FR BLAKE

## (undated) DEVICE — TRAY SURESTEP 16 BARDEX UMETR

## (undated) DEVICE — SUT PROLENE 2-0 SH 8833H

## (undated) DEVICE — ENSEAL 20 CM SHAFT, LARGE JAW: Brand: ENSEAL X1

## (undated) DEVICE — COVER,MAYO STAND,STERILE: Brand: MEDLINE

## (undated) DEVICE — SUT PDS II 1 CTX Z371T

## (undated) DEVICE — TUBING CYSTO

## (undated) DEVICE — STERILE POLYISOPRENE POWDER-FREE SURGICAL GLOVES: Brand: PROTEXIS

## (undated) DEVICE — KIT CUSTOM ENDOPROCEDURE STERIS

## (undated) DEVICE — LIGHT HANDLE

## (undated) DEVICE — BAG DRAIN INFECTION CNTRL 2000

## (undated) DEVICE — SOLUTION  .9 3000ML

## (undated) DEVICE — BETADINE SOLUTION 8 OZ BTL

## (undated) DEVICE — LAPAROVUE VISIBILITY SYSTEM LAPAROSCOPIC SOLUTIONS: Brand: LAPAROVUE

## (undated) DEVICE — TROCAR: Brand: KII SHIELDED BLADED ACCESS SYSTEM

## (undated) DEVICE — SPONGE STICK WITH PVP-I: Brand: KENDALL

## (undated) DEVICE — SIGMOIDOSCOPE LIGHTED BIOSEAL

## (undated) DEVICE — CADIERE FORCEPS: Brand: ENDOWRIST

## (undated) DEVICE — SLEEVE KENDALL SCD EXPRESS MED

## (undated) DEVICE — 3M™ STERI-STRIP™ REINFORCED ADHESIVE SKIN CLOSURES, R1547, 1/2 IN X 4 IN (12 MM X 100 MM), 6 STRIPS/ENVELOPE: Brand: 3M™ STERI-STRIP™

## (undated) DEVICE — BLADELESS OBTURATOR: Brand: WECK VISTA

## (undated) DEVICE — Device

## (undated) DEVICE — FILTERLINE NASAL ADULT O2/CO2

## (undated) DEVICE — ENDOPATH ULTRA VERESS INSUFFLATION NEEDLES WITH LUER LOCK CONNECTORS: Brand: ENDOPATH

## (undated) DEVICE — VALVE AIR/H20 DEFENDO SCT BX

## (undated) DEVICE — DRAPE,TOP,102X53,STERILE: Brand: MEDLINE

## (undated) DEVICE — SUT ETHILON 2-0 FS 664H

## (undated) DEVICE — V2 SPECIMEN COLLECTION MANIFOLD KIT: Brand: NEPTUNE

## (undated) DEVICE — LAP COLON CDS: Brand: MEDLINE INDUSTRIES, INC.

## (undated) DEVICE — SEAL

## (undated) DEVICE — COLUMN DRAPE

## (undated) DEVICE — STAPLER CIRCULAR ENDO 29MM

## (undated) DEVICE — ARM DRAPE

## (undated) DEVICE — REDUCER: Brand: ENDOWRIST

## (undated) DEVICE — 3M™ RED DOT™ MONITORING ELECTRODE WITH FOAM TAPE AND STICKY GEL 2570-3, 3/BAG, 200/CASE, 54/PLT: Brand: RED DOT™

## (undated) DEVICE — EVACUATOR RELIAVAC 100CC

## (undated) DEVICE — SUT VICRYL 0 J912G

## (undated) DEVICE — 1200CC GUARDIAN II: Brand: GUARDIAN

## (undated) DEVICE — MONOFILAMENT ABSORBABLE SUTURE: Brand: MAXON

## (undated) DEVICE — CLOSING BUNDLE: Brand: MEDLINE INDUSTRIES, INC.

## (undated) DEVICE — CANNULA SEAL

## (undated) DEVICE — VIOLET BRAIDED (POLYGLACTIN 910), SYNTHETIC ABSORBABLE SUTURE: Brand: COATED VICRYL

## (undated) DEVICE — SUT VICRYL 5-0 PC-1 J834G

## (undated) DEVICE — GOWN,SIRUS,FABRIC-REINFORCED,X-LARGE: Brand: MEDLINE

## (undated) DEVICE — 40580 - THE PINK PAD - ADVANCED TRENDELENBURG POSITIONING KIT: Brand: 40580 - THE PINK PAD - ADVANCED TRENDELENBURG POSITIONING KIT

## (undated) DEVICE — SYRINGE 30ML LL TIP

## (undated) DEVICE — TIP-UP FENESTRATED GRASPER: Brand: ENDOWRIST

## (undated) DEVICE — MEDI-VAC TUBING CONNECTOR 6-IN-1 "Y" POLYPROPYLENE: Brand: CARDINAL HEALTH

## (undated) NOTE — LETTER
Patient Name: Can Brandt  YOB: 1965          MRN number:  [id number]  Date:  6/22/2021  Referring Physician:  Yahaira Macias         SPINE EVALUATION:    Referring Physician: Dr. 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 Signs and symptoms are consistent with diagnosis of neck DDD, L shldr pain. Pt and PT discussed evaluation findings, pathology, POC and HEP.   Pt voiced understanding and performs HEP correctly without reported p breathing, cane bench, 2x/day. Charges: PT Eval Moderate Complexity, 1 there ex.       Total Timed Treatment: 12 min     Total Treatment Time: 45 min     Based on clinical rationale and outcome measures, this evaluation involved Moderate Complexity decis precautions, and treatment options and has agreed to actively participate in planning and for this course of care. Thank you for your referral. Please co-sign or sign and return this letter via fax as soon as possible to [phone].  If you have any qu

## (undated) NOTE — ED AVS SNAPSHOT
Carolina Mcintyre   MRN: [id number]    Department:  THE [facility] Emergency Department in [place]   Date of Visit:  10/11/2019           Disclosure     Insurance plans vary and the physician(s) referred by the ER may not be covered by your plan.  Please c tell this physician (or your personal doctor if your instructions are to return to your personal doctor) about any new or lasting problems. The primary care or specialist physician will see patients referred from the [facility] Emergency Department.  Rj Hernadez

## (undated) NOTE — LETTER
Patient Name: Raulito Zurita  YOB: 1965          MRN number:  [id number]  Date:  7/13/2021  Referring Physician:  Hayley Javed    Discharge Summary  Number of Visits Attended 7 in Physical Therapy    Dear Dr. Tesha Estes,    Discharge for shoulder (was 41/100 at eval)       Assessment: Since initial eval on 6/22/21,  Pt now has full L shldr AROM all planes (> 100 deg gain in flex, abd). Cervical AROM now [facility] all planes and no longer with pain (5-50% gain).   Posture improved with symmet contact me at Dept: [phone]. Sincerely,  Electronically signed by therapist: Telly Olivas PT     Physician's certification required:  No  Please co-sign or sign and return this letter via fax as soon as possible to [phone].    I certify the

## (undated) NOTE — MR AVS SNAPSHOT
[facility]  [address] [address] [phone]               Thank you for choosing us for your health care visit with Nando Moore MD.  We are glad to serve you and happy to provide you with this summary of yo - PEG 3350-KCl-Na Bicarb-NaCl 420 g Solr            MyChart     Visit MyChart  You can access your MyChart to more actively manage your health care and view more details from this visit by going to [email or web address].   If you've recently had a s Don’t forget strength training with weights and resistance Set goals and track your progress   You don’t need to join a gym. Home exercises work great.  Put more priority on exercise in your life                    Visit [facility] online at

## (undated) NOTE — IP AVS SNAPSHOT
[facility] [place] [address] [place], [address] ~ [phone]                Discharge Summary   4/28/2017    [facility]           Admission Information        Provider Department    4/28/2017 Yariel Mccrary MD  3nw-A Commonly known as:  ZOFRAN-ODT        Take 1 tablet (4 mg total) by mouth every 6 (six) hours as needed for Nausea.    Stop taking on:  5/3/2017    Elverfelecia                            PEG 3350/Electrolytes 240 g Solr        Use as directed per physi 11.5 (04/26/17)  4.46 (04/26/17)  13.2 (04/26/17)  39.1 (04/26/17)  87.7    (04/26/17)  229.0       Recent Hematology Lab Results (cont.)  (Last 3 results in the past 90 days)    Neutrophil % Lymphocyte % Monocyte % Eosinophil % Basophil % Prelim Neut Abs coverage. Patient [address] is a Federal Navigator program that can help with your Affordable Care Act coverage, as well as all types of Medicaid plans.   To get signed up and covered, please call [phone] and ask to get set up for an insuran HYDROcodone-acetaminophen 5-325 MG Oral Tab       Use:  Treat pain   Most common side effects: Constipation, drowsiness, dizziness, urinary retention (inability to urinate)   What to report to your healthcare team: Difficulty urinating, dizziness, no reinaldo

## (undated) NOTE — LETTER
24    Patient: Diane Major  : 3/27/1965 Visit date: 2024    Dear  Debbie Luna MD    Thank you for referring Diane Major to my practice.  Please find my assessment and plan below.     Assessment   1. Rectal bleeding        This is a very nice 58-year-old female previously known to Dr. Patel and Dr. Banks.  Patient has a history of recurrent diverticulitis.  She underwent a colonoscopy with Dr. Patel on 2017.  She was found to have diverticulosis and internal hemorrhoids and was given a 10-year recall.  Patient underwent robotic low anterior resection with Dr. Banks on 2022.  She did well after the surgery and last saw Dr. Banks on 2022.  She was told to follow-up as needed at that time.    Patient presents to me because she has seen blood in her stool on 2 occasions this month.  She recalls being constipated with some straining when this bleeding happened.  She denies any rectal pain, prolapse, itching, seepage or incontinence.  She denies any persistent change in bowel habits, anemia or unintentional weight loss.  No family history of colon or rectal cancer.  No blood thinners.    External exam of the anus is normal.  Digital rectal exam reveals good tone without any palpable masses and no bleeding.  Anoscopy reveals small internal hemorrhoids.    Plan  I recommend planning for a colonoscopy to rule out any large polyps or malignancy in the setting of rectal bleeding.  The details of the colonoscopy procedure were discussed including the prep instructions, risks, benefits and alternatives.  Patient expressed understanding and colonoscopy has been scheduled for 2024.      Sincerely,       Ronaldo Lewis MD   CC:   No Recipients

## (undated) NOTE — LETTER
22    Patient: Payt Ba  : 3/27/1965 Visit date: 2022    Dear  Kait Paul MD    Thank you for referring Paty Ba to my practice. Please find my assessment and plan below. Assessment   Acute diverticulitis  (primary encounter diagnosis)      Plan   The patient presents for continued care and evaluation following a robotic low anterior resection of the rectosigmoid colon for diverticulitis on 2022. Overall, the patient states she is doing well postoperatively. Her primary complaint is that she has episodes of intermittent pelvic cramping that radiates to her back. She states this typically occurs after meals and she has to walk out the pain. She states for the most part, she is taking ibuprofen and Tylenol for pain management. She states she required Norco yesterday. She is tolerating general diet. She denies nausea or vomiting. She is having regular bowel movements. She denies diarrhea or constipation. She states her bowel movements have been soft and smaller in size. She denies hematochezia, melena or mucus in her stools. The patient's drain remains in place. She states she has had approximately 55 cc serosanguineous output for the last 3 to 4 days. The patient is curious when she needs her next colonoscopy. She had her first at the age of 50. She has never had colon polyps. She has no family history of colon cancer. Clinical examination of the abdomen reveals it to be soft, nondistended, nontender, bowel sounds are normal activity normal pitch. There  is no rebounding tenderness or guarding. There are no signs of ascites or peritonitis. The liver and spleen are nonpalpable. There are no palpable masses. There are no umbilical or inguinal hernias. Transverse left abdominal incision and laparoscopic incision sites are clean, dry, intact without surrounding erythema or cellulitis. Steri-Strips are in place.   I took the opportunity during today's visit to remove the patient's drain. The patient is doing well status post robotic low anterior resection of the rectosigmoid colon for diverticulitis on December 19, 2022. I took the opportunity at this appointment to discuss the pathology with the patient which revealed diverticulitis. I discussed with the patient that they should refrain from any bending, pushing, pulling, twisting, or lifting of a force greater than 20 pounds for 8 weeks post-op. The patient should avoid submerging their incisions in a bath, hot tub, pool for a total of 2 weeks postoperatively. She was counseled and when to return to driving. All of the patient's questions were answered. The patient verbalized understanding and agreement with the plan of care. I have no further follow-up scheduled with this patient at this time. This patient can see me on an as-needed basis. This patient should return urgently for any problems or complications related to the surgical intervention.            Sincerely,       Genoveva Mesa MD   CC: No Recipients

## (undated) NOTE — ED AVS SNAPSHOT
THE [facility] Emergency Department in [address] [facility]    Phone:  [phone]    Fax:  [address]   MRN: [id number]    Department:  THE [facility] Emergency Department in [place]   Date of Vi 04/26/2017  17:16 metRONIDAZOLE in NaCl (FLAGYL) 5 mg/ml IVPB premix 500 mg 500 mg                Admin Date Administration Dose                   04/26/2017  17:24 HYDROcodone-acetaminophen (NORCO) 5-325 MG per tab 1 tablet 1 tablet                     [facility] [address] [employer] [place] [phone]  Pediatric [phone] Emergency Department   [phone]       To Check ER Wait Times:  TEXT 'ERwait' to 00816      Click [email or web address]      Or call [phone]    If you have any will be contacted. Please make sure we have your correct phone number before you leave. After you leave, you should follow the attached instructions. I have read and understand the instructions given to me by my caregivers.         24-Hour Pharmacies CT ABDOMEN+PELVIS(CONTRAST ONLY)(CPT=74177) (Final result) Result time:  04/26/17 15:13:59    Final result    Impression:    CONCLUSION:       Acute diverticulitis involving the splenic flexure and mid descending colon without evidence of abscess. or free air. Multiple diverticula are noted within the sigmoid colon. The small bowel is unremarkable. ABDOMINAL WALL:  Normal.  No mass or hernia. URINARY BLADDER:  Normal.  No visible focal wall thickening, lesion, or calculus.     PELVIC NODES:  Nor

## (undated) NOTE — Clinical Note
I had the pleasure of seeing Ms. Major in my office today. Please see my attached note.       Jenna Farrell

## (undated) NOTE — LETTER
22    Patient: Haylee Pan  : 3/27/1965 Visit date: 2022    Dear  Kelsey Hill MD    Thank you for referring Haylee Pan to my practice. Please find my assessment and plan below. Assessment   Acute diverticulitis  (primary encounter diagnosis)    Plan   This patient presents for further care and treatment regarding her episodes of acute diverticulitis. She has now had a fairly continuous attack of diverticulitis for the last several weeks. Her presenting history as listed below:    The patient states she has had 5 diverticulitis attacks in the last 5 years. She states she had 2 major attacks, requiring hospitalization. The second of these major attacks was her attack in October. She states she had several days of worsening abdominal bloating, left lower quadrant abdominal pain and nausea. Her significant pain was associated with decreased appetite. As her symptoms continue to worsen, she ultimately presented to the emergency department and was hospitalized on IV antibiotics. She states while hospitalized, she had 3 instances of blood in her stool. She denies diarrhea or constipation. She denies melena or mucus in her stools. CT scan of her abdomen and pelvis from 2022 during her hospitalization reveals significant diverticulitis of the descending colon. The inflammation of her colon and thickening of her colon wall extends up near the splenic flexure. There is a developing phlegmon/possible abscess in the distal descending colon. There is no evidence of free air or fluid. All other significant findings can be seen in the radiologist report. The patient states while she was hospitalized, she was on a clear liquid diet and slowly advanced. She is ultimately discharged home on 2022 on Levaquin and Flagyl. She has been on and off Levaquin and Flagyl since that discharge. Includes up to today's visit.     She remains with some tenderness in the left lower quadrant. She has no fever or chills. She has no nausea or vomiting. She has no diarrhea or constipation. She is tolerating a general diet. Clinical exam today reveals her abdomen to be soft, nondistended, tender in the left lower quadrant to moderate palpation. She has slight guarding, no rebound. No masses. Body weight is 205 pounds. Liver and spleen are not palpable. There are no inguinal or umbilical hernias present. There is a midline incision that starts in the epigastrium and extends just below the umbilicus and measures approximately 5 inches in length. There is a ventral hernia repair with mesh at the site. There is no evidence of recurrence of the hernia. Her point of maximal tenderness is near the anterior superior iliac spine at the edge of the rectus sheath. There are her no palpable masses at that site or fullness. This patient has an ongoing attack of diverticulitis. She will be on antibiotics up until the day of operation. She should actually stop the antibiotics the day prior to surgery as she will be doing a bowel prep on that date that will include antibiotics. The patient should present to us urgently for any advancing symptoms prior to her scheduled date of surgery. She is currently scheduled for December 19, 2022.          Sincerely,       Dav Walker MD   CC: No Recipients

## (undated) NOTE — ED AVS SNAPSHOT
[facility] in [address] [facility]    Phone:  [phone]    Fax:  [address]   MRN: [id number]    Department:  [facility] in [address]   Date of Vi IF THERE IS ANY CHANGE OR WORSENING OF YOUR CONDITION, CALL YOUR PRIMARY CARE PHYSICIAN AT ONCE OR RETURN IMMEDIATELY TO THE EMERGENCY DEPARTMENT.     If you have been prescribed any medication(s), please fill your prescription right away and begin taking t

## (undated) NOTE — Clinical Note
I had the pleasure of seeing Ms. Major in my office today. Please see my attached note.       Lionel Lin

## (undated) NOTE — LETTER
22    Patient: Hudson Wiggins  : 3/27/1965 Visit date: 2022    Dear  Nidia Meckel, MD    Thank you for referring Hudson Wiggins to my practice. Please find my assessment and plan below. Assessment   Acute diverticulitis  (primary encounter diagnosis)      Plan   The patient presents for evaluation following a hospitalization from 2022 until 2022 for an acute diverticulitis attack. The patient states she has had 5 diverticulitis attacks in the last 5 years. She states she had 2 major attacks, requiring hospitalization. The second of these major attacks was her attack in October. She states she had several days of worsening abdominal bloating, left lower quadrant abdominal pain and nausea. Her significant pain was associated with decreased appetite. As her symptoms continue to worsen, she ultimately presented to the emergency department and was hospitalized on IV antibiotics. She states while hospitalized, she had 3 instances of blood in her stool. She denies diarrhea or constipation. She denies melena or mucus in her stools. CT scan of her abdomen and pelvis from 2022 during her hospitalization reveals significant diverticulitis of the descending colon. The inflammation of her colon and thickening of her colon wall extends up near the splenic flexure. There is a developing phlegmon/possible abscess in the distal descending colon. There is no evidence of free air or fluid. All other significant findings can be seen in the radiologist report. The patient states while she was hospitalized, she was on a clear liquid diet and slowly advanced. She is ultimately discharged home on 2022 on Levaquin and Flagyl. The patient states she completed her course of antibiotics 5 days ago. At today's visit, the patient states she is no longer having any symptoms.   She states she had 1 \"twinge\" of left lower quadrant abdominal pain 2 days ago, but this resolved. She denies current fevers or chills. She is tolerating a bland diet. She denies nausea or vomiting. The patient has no significant past medical history. She does not take any blood thinners. The patient had a laparoscopic cholecystectomy in 1993. She also had an umbilical herniorrhaphy with mesh in 1998. The patient's last colonoscopy was on July 7, 2017 with Dr. Candace Taylor. She had diverticulosis at that time. She had this colonoscopy following her first major diverticulitis attack. The patient denies any first or second-degree relatives with a history of colon cancer/colon polyps. The patient denies any first or second-degree relatives with history of uterine cancer. Clinical examination of the abdomen reveals it to be soft, nondistended, nontender, bowel sounds are normal activity normal pitch. There  is no rebounding tenderness or guarding. There are no signs of ascites or peritonitis. The liver and spleen are nonpalpable. There are no palpable masses. There are no umbilical or inguinal hernias. The patient has a well-healed midline incision. I discussed with the patient that I do recommend a robotic low anterior resection of the rectosigmoid colon for other diverticulitis to remove the section of the diseased colon. I discussed with the patient that this procedure does carry a 3 to 5% risk of an anastomotic leak. If this were to occur, the patient would then require an emergent laparotomy with likely creation of colostomy. It was discussed that at this time however, the risk of an anastomotic leak is less than there lifetime risk of a perforation secondary there diverticulitis. We discussed other risks of surgery including bleeding and infection. I recommend the patient refrain from advancing her diet, until her symptoms are entirely resolved and she is feeling 100% healthy. At that time she may increase the fiber in her diet.     I instructed the patient to call our office with any new or worsening symptoms. She should call with any signs of an acute diverticulitis attack. She is currently scheduled for a low anterior resection of the rectosigmoid colon on December 19, 2022. If she has a diverticulitis attack prior to this, we will prescribe her antibiotics. The patient and her  expressed understanding with the above plan. All questions and concerns were addressed.       Sincerely,       Rosalva Pham MD   CC: No Recipients